# Patient Record
Sex: FEMALE | Race: BLACK OR AFRICAN AMERICAN | NOT HISPANIC OR LATINO | Employment: UNEMPLOYED | URBAN - METROPOLITAN AREA
[De-identification: names, ages, dates, MRNs, and addresses within clinical notes are randomized per-mention and may not be internally consistent; named-entity substitution may affect disease eponyms.]

---

## 2024-08-27 ENCOUNTER — OFFICE VISIT (OUTPATIENT)
Dept: URGENT CARE | Facility: URGENT CARE | Age: 49
End: 2024-08-27
Payer: COMMERCIAL

## 2024-08-27 VITALS
OXYGEN SATURATION: 98 % | WEIGHT: 265 LBS | HEART RATE: 91 BPM | BODY MASS INDEX: 42.77 KG/M2 | DIASTOLIC BLOOD PRESSURE: 127 MMHG | SYSTOLIC BLOOD PRESSURE: 194 MMHG | TEMPERATURE: 98.7 F

## 2024-08-27 DIAGNOSIS — R05.1 ACUTE COUGH: Primary | ICD-10-CM

## 2024-08-27 DIAGNOSIS — L29.9 PRURITIC DISORDER: ICD-10-CM

## 2024-08-27 DIAGNOSIS — I10 BENIGN ESSENTIAL HYPERTENSION: ICD-10-CM

## 2024-08-27 DIAGNOSIS — Z20.818 STREP THROAT EXPOSURE: ICD-10-CM

## 2024-08-27 LAB — SARS-COV-2 RNA RESP QL NAA+PROBE: NEGATIVE

## 2024-08-27 PROCEDURE — 87635 SARS-COV-2 COVID-19 AMP PRB: CPT | Performed by: FAMILY MEDICINE

## 2024-08-27 PROCEDURE — 99204 OFFICE O/P NEW MOD 45 MIN: CPT | Performed by: FAMILY MEDICINE

## 2024-08-27 RX ORDER — AMOXICILLIN 500 MG/1
500 CAPSULE ORAL 2 TIMES DAILY
Qty: 20 CAPSULE | Refills: 0 | Status: SHIPPED | OUTPATIENT
Start: 2024-08-27 | End: 2024-09-06

## 2024-08-27 RX ORDER — LISINOPRIL/HYDROCHLOROTHIAZIDE 10-12.5 MG
1 TABLET ORAL DAILY
Qty: 30 TABLET | Refills: 1 | Status: SHIPPED | OUTPATIENT
Start: 2024-08-27 | End: 2024-09-26

## 2024-08-27 RX ORDER — BENZONATATE 200 MG/1
200 CAPSULE ORAL 3 TIMES DAILY PRN
Qty: 21 CAPSULE | Refills: 0 | Status: SHIPPED | OUTPATIENT
Start: 2024-08-27 | End: 2024-09-03

## 2024-08-27 NOTE — LETTER
August 27, 2024      Edna Horton  [address] [facility] [address]        To Whom It May Concern:    Edna Horton was seen in our clinic 8/27/24. She may return to work 8/29/24  without restrictions.      Sincerely,        Dylan Verma, DO

## 2024-08-27 NOTE — PROGRESS NOTES
"SUBJECTIVE: Edna Horton is a 49 year old female presenting with a chief complaint of \"cold symptoms\" and cough .  Onset of symptoms was 4 day(s) ago.  Predisposing factors include tobacco.    No past medical history on file.  No Known Allergies  Social History     Tobacco Use    Smoking status: Every Day     Types: Cigarettes    Smokeless tobacco: Not on file   Substance Use Topics    Alcohol use: Not on file       ROS:  SKIN: no rash  GI: no vomiting    OBJECTIVE:  BP (!) 194/127   Pulse 91   Temp 98.7  F (37.1  C) (Tympanic)   Wt 120.2 kg (265 lb)   SpO2 98%   BMI 42.77 kg/m  GENERAL APPEARANCE: healthy, alert and no distress  EYES: EOMI,  PERRL, conjunctiva clear  HENT: ear canals and TM's normal.  Nose and mouth without ulcers, erythema or lesions  RESP: lungs clear to auscultation - no rales, rhonchi or wheezes  SKIN: no suspicious lesions or rashes      ICD-10-CM    1. Acute cough  R05.1 Symptomatic COVID-19 Virus (Coronavirus) by PCR Nose     benzonatate (TESSALON) 200 MG capsule      2. Pruritic disorder  L29.9 Adult Dermatology  Referral      3. Benign essential hypertension  I10 lisinopril-hydrochlorothiazide (ZESTORETIC) 10-12.5 MG tablet      4. Strep throat exposure  Z20.818 amoxicillin (AMOXIL) 500 MG capsule        Fluids/Rest, f/u if worse/not any better    "

## 2024-12-07 ENCOUNTER — APPOINTMENT (OUTPATIENT)
Dept: GENERAL RADIOLOGY | Facility: CLINIC | Age: 49
DRG: 287 | End: 2024-12-07
Attending: EMERGENCY MEDICINE
Payer: COMMERCIAL

## 2024-12-07 ENCOUNTER — HOSPITAL ENCOUNTER (EMERGENCY)
Facility: CLINIC | Age: 49
Discharge: LEFT BEFORE DISCHARGE | DRG: 287 | End: 2024-12-08
Attending: EMERGENCY MEDICINE | Admitting: EMERGENCY MEDICINE
Payer: COMMERCIAL

## 2024-12-07 VITALS
SYSTOLIC BLOOD PRESSURE: 191 MMHG | TEMPERATURE: 98.5 F | DIASTOLIC BLOOD PRESSURE: 106 MMHG | WEIGHT: 265 LBS | RESPIRATION RATE: 16 BRPM | HEART RATE: 92 BPM | OXYGEN SATURATION: 98 % | BODY MASS INDEX: 42.77 KG/M2

## 2024-12-07 DIAGNOSIS — I16.0 HYPERTENSIVE URGENCY: ICD-10-CM

## 2024-12-07 DIAGNOSIS — R07.9 CHEST PAIN, UNSPECIFIED TYPE: ICD-10-CM

## 2024-12-07 LAB
ALBUMIN SERPL BCG-MCNC: 3.7 G/DL (ref 3.5–5.2)
ALP SERPL-CCNC: 92 U/L (ref 40–150)
ALT SERPL W P-5'-P-CCNC: 11 U/L (ref 0–50)
ANION GAP SERPL CALCULATED.3IONS-SCNC: 11 MMOL/L (ref 7–15)
AST SERPL W P-5'-P-CCNC: 21 U/L (ref 0–45)
ATRIAL RATE - MUSE: 89 BPM
BASOPHILS # BLD AUTO: 0.1 10E3/UL (ref 0–0.2)
BASOPHILS NFR BLD AUTO: 1 %
BILIRUB SERPL-MCNC: 0.7 MG/DL
BUN SERPL-MCNC: 9.9 MG/DL (ref 6–20)
CALCIUM SERPL-MCNC: 9.1 MG/DL (ref 8.8–10.4)
CHLORIDE SERPL-SCNC: 104 MMOL/L (ref 98–107)
CREAT SERPL-MCNC: 0.89 MG/DL (ref 0.51–0.95)
D DIMER PPP FEU-MCNC: 8.7 UG/ML FEU (ref 0–0.5)
DIASTOLIC BLOOD PRESSURE - MUSE: NORMAL MMHG
EGFRCR SERPLBLD CKD-EPI 2021: 80 ML/MIN/1.73M2
EOSINOPHIL # BLD AUTO: 0.2 10E3/UL (ref 0–0.7)
EOSINOPHIL NFR BLD AUTO: 1 %
ERYTHROCYTE [DISTWIDTH] IN BLOOD BY AUTOMATED COUNT: 13.5 % (ref 10–15)
GLUCOSE SERPL-MCNC: 143 MG/DL (ref 70–99)
HCO3 SERPL-SCNC: 25 MMOL/L (ref 22–29)
HCT VFR BLD AUTO: 34.9 % (ref 35–47)
HGB BLD-MCNC: 11.1 G/DL (ref 11.7–15.7)
HOLD SPECIMEN: NORMAL
IMM GRANULOCYTES # BLD: 0.1 10E3/UL
IMM GRANULOCYTES NFR BLD: 1 %
INTERPRETATION ECG - MUSE: NORMAL
LYMPHOCYTES # BLD AUTO: 2.5 10E3/UL (ref 0.8–5.3)
LYMPHOCYTES NFR BLD AUTO: 23 %
MCH RBC QN AUTO: 28.2 PG (ref 26.5–33)
MCHC RBC AUTO-ENTMCNC: 31.8 G/DL (ref 31.5–36.5)
MCV RBC AUTO: 89 FL (ref 78–100)
MONOCYTES # BLD AUTO: 0.7 10E3/UL (ref 0–1.3)
MONOCYTES NFR BLD AUTO: 6 %
NEUTROPHILS # BLD AUTO: 7.4 10E3/UL (ref 1.6–8.3)
NEUTROPHILS NFR BLD AUTO: 68 %
NRBC # BLD AUTO: 0 10E3/UL
NRBC BLD AUTO-RTO: 0 /100
P AXIS - MUSE: 65 DEGREES
PLATELET # BLD AUTO: 248 10E3/UL (ref 150–450)
POTASSIUM SERPL-SCNC: 3.8 MMOL/L (ref 3.4–5.3)
PR INTERVAL - MUSE: 198 MS
PROT SERPL-MCNC: 7.5 G/DL (ref 6.4–8.3)
QRS DURATION - MUSE: 76 MS
QT - MUSE: 398 MS
QTC - MUSE: 484 MS
R AXIS - MUSE: 14 DEGREES
RBC # BLD AUTO: 3.94 10E6/UL (ref 3.8–5.2)
SODIUM SERPL-SCNC: 140 MMOL/L (ref 135–145)
SYSTOLIC BLOOD PRESSURE - MUSE: NORMAL MMHG
T AXIS - MUSE: 99 DEGREES
TROPONIN T SERPL HS-MCNC: 26 NG/L
TROPONIN T SERPL HS-MCNC: 28 NG/L
VENTRICULAR RATE- MUSE: 89 BPM
WBC # BLD AUTO: 10.9 10E3/UL (ref 4–11)

## 2024-12-07 PROCEDURE — 80053 COMPREHEN METABOLIC PANEL: CPT | Performed by: EMERGENCY MEDICINE

## 2024-12-07 PROCEDURE — 85379 FIBRIN DEGRADATION QUANT: CPT | Performed by: STUDENT IN AN ORGANIZED HEALTH CARE EDUCATION/TRAINING PROGRAM

## 2024-12-07 PROCEDURE — 84484 ASSAY OF TROPONIN QUANT: CPT | Mod: 91 | Performed by: EMERGENCY MEDICINE

## 2024-12-07 PROCEDURE — 85025 COMPLETE CBC W/AUTO DIFF WBC: CPT | Performed by: EMERGENCY MEDICINE

## 2024-12-07 PROCEDURE — 93005 ELECTROCARDIOGRAM TRACING: CPT

## 2024-12-07 PROCEDURE — 36415 COLL VENOUS BLD VENIPUNCTURE: CPT | Performed by: EMERGENCY MEDICINE

## 2024-12-07 PROCEDURE — 250N000011 HC RX IP 250 OP 636: Performed by: EMERGENCY MEDICINE

## 2024-12-07 PROCEDURE — 71046 X-RAY EXAM CHEST 2 VIEWS: CPT

## 2024-12-07 PROCEDURE — 99291 CRITICAL CARE FIRST HOUR: CPT | Mod: 25

## 2024-12-07 PROCEDURE — 96374 THER/PROPH/DIAG INJ IV PUSH: CPT

## 2024-12-07 PROCEDURE — 250N000013 HC RX MED GY IP 250 OP 250 PS 637: Performed by: EMERGENCY MEDICINE

## 2024-12-07 RX ORDER — LISINOPRIL 10 MG/1
10 TABLET ORAL DAILY
COMMUNITY
End: 2024-12-07

## 2024-12-07 RX ORDER — NITROGLYCERIN 0.4 MG/1
0.4 TABLET SUBLINGUAL ONCE
Status: COMPLETED | OUTPATIENT
Start: 2024-12-07 | End: 2024-12-07

## 2024-12-07 RX ORDER — LISINOPRIL AND HYDROCHLOROTHIAZIDE 10; 12.5 MG/1; MG/1
1 TABLET ORAL DAILY
Status: ON HOLD | COMMUNITY
End: 2024-12-09

## 2024-12-07 RX ORDER — LABETALOL HYDROCHLORIDE 5 MG/ML
10 INJECTION, SOLUTION INTRAVENOUS ONCE
Status: COMPLETED | OUTPATIENT
Start: 2024-12-07 | End: 2024-12-07

## 2024-12-07 RX ADMIN — NITROGLYCERIN 0.4 MG: 0.4 TABLET SUBLINGUAL at 19:55

## 2024-12-07 RX ADMIN — LABETALOL HYDROCHLORIDE 10 MG: 5 INJECTION INTRAVENOUS at 20:16

## 2024-12-07 ASSESSMENT — ACTIVITIES OF DAILY LIVING (ADL)
ADLS_ACUITY_SCORE: 41

## 2024-12-07 NOTE — ED TRIAGE NOTES
"  Chest pain started this morning, never had pain like this, it just hurts, I dont know if its gas or I slept wrong,  2021 was seen for CP and had \"mild\" heart attack , coughed up what appeared to be a blood clot on Wednesday       "

## 2024-12-07 NOTE — ED TRIAGE NOTES
Pt arrives via ambulance from home for HTN and chest pain. Pt began having chest pain at 0730 this morning and has been intermittent all day. Hypertensive for EMS with . 324 ASA given by EMS. .

## 2024-12-07 NOTE — ED TRIAGE NOTES
Triage Assessment (Adult)       Row Name 12/07/24 6549          Triage Assessment    Airway WDL WDL        Respiratory WDL    Respiratory WDL WDL        Skin Circulation/Temperature WDL    Skin Circulation/Temperature WDL WDL        Cardiac WDL    Cardiac WDL X  right chest pain, comes and goes, moving makes it worse        Cognitive/Neuro/Behavioral WDL    Cognitive/Neuro/Behavioral WDL WDL

## 2024-12-08 ENCOUNTER — APPOINTMENT (OUTPATIENT)
Dept: CT IMAGING | Facility: CLINIC | Age: 49
DRG: 287 | End: 2024-12-08
Attending: EMERGENCY MEDICINE

## 2024-12-08 ENCOUNTER — HOSPITAL ENCOUNTER (INPATIENT)
Facility: CLINIC | Age: 49
LOS: 1 days | Discharge: HOME | DRG: 287 | End: 2024-12-11
Attending: EMERGENCY MEDICINE | Admitting: STUDENT IN AN ORGANIZED HEALTH CARE EDUCATION/TRAINING PROGRAM

## 2024-12-08 DIAGNOSIS — R94.39 ABNORMAL CARDIOVASCULAR STRESS TEST: ICD-10-CM

## 2024-12-08 DIAGNOSIS — R07.9 CHEST PAIN, UNSPECIFIED TYPE: ICD-10-CM

## 2024-12-08 DIAGNOSIS — I16.0 HYPERTENSIVE URGENCY: ICD-10-CM

## 2024-12-08 DIAGNOSIS — I21.4 NSTEMI (NON-ST ELEVATED MYOCARDIAL INFARCTION) (H): ICD-10-CM

## 2024-12-08 DIAGNOSIS — I1A.0 RESISTANT HYPERTENSION: ICD-10-CM

## 2024-12-08 DIAGNOSIS — E78.00 HYPERCHOLESTEREMIA: Primary | ICD-10-CM

## 2024-12-08 DIAGNOSIS — N92.4 EXCESSIVE BLEEDING IN PREMENOPAUSAL PERIOD: ICD-10-CM

## 2024-12-08 LAB
ANION GAP SERPL CALCULATED.3IONS-SCNC: 12 MMOL/L (ref 7–15)
ATRIAL RATE - MUSE: 81 BPM
BASOPHILS # BLD AUTO: 0 10E3/UL (ref 0–0.2)
BASOPHILS NFR BLD AUTO: 0 %
BUN SERPL-MCNC: 12.1 MG/DL (ref 6–20)
CALCIUM SERPL-MCNC: 9.2 MG/DL (ref 8.8–10.4)
CHLORIDE SERPL-SCNC: 102 MMOL/L (ref 98–107)
CHOLEST SERPL-MCNC: 178 MG/DL
CREAT SERPL-MCNC: 1.12 MG/DL (ref 0.51–0.95)
DIASTOLIC BLOOD PRESSURE - MUSE: NORMAL MMHG
EGFRCR SERPLBLD CKD-EPI 2021: 60 ML/MIN/1.73M2
EOSINOPHIL # BLD AUTO: 0.2 10E3/UL (ref 0–0.7)
EOSINOPHIL NFR BLD AUTO: 2 %
ERYTHROCYTE [DISTWIDTH] IN BLOOD BY AUTOMATED COUNT: 13.6 % (ref 10–15)
EST. AVERAGE GLUCOSE BLD GHB EST-MCNC: 148 MG/DL
FERRITIN SERPL-MCNC: 53 NG/ML (ref 6–175)
GLUCOSE SERPL-MCNC: 230 MG/DL (ref 70–99)
HBA1C MFR BLD: 6.8 %
HCO3 SERPL-SCNC: 26 MMOL/L (ref 22–29)
HCT VFR BLD AUTO: 32.6 % (ref 35–47)
HDLC SERPL-MCNC: 42 MG/DL
HGB BLD-MCNC: 10.6 G/DL (ref 11.7–15.7)
HOLD SPECIMEN: NORMAL
IMM GRANULOCYTES # BLD: 0 10E3/UL
IMM GRANULOCYTES NFR BLD: 0 %
INTERPRETATION ECG - MUSE: NORMAL
IRON BINDING CAPACITY (ROCHE): 331 UG/DL (ref 240–430)
IRON SATN MFR SERPL: 19 % (ref 15–46)
IRON SERPL-MCNC: 64 UG/DL (ref 37–145)
LDLC SERPL CALC-MCNC: 115 MG/DL
LYMPHOCYTES # BLD AUTO: 2.6 10E3/UL (ref 0.8–5.3)
LYMPHOCYTES NFR BLD AUTO: 26 %
MCH RBC QN AUTO: 28.5 PG (ref 26.5–33)
MCHC RBC AUTO-ENTMCNC: 32.5 G/DL (ref 31.5–36.5)
MCV RBC AUTO: 88 FL (ref 78–100)
MONOCYTES # BLD AUTO: 0.7 10E3/UL (ref 0–1.3)
MONOCYTES NFR BLD AUTO: 7 %
NEUTROPHILS # BLD AUTO: 6.3 10E3/UL (ref 1.6–8.3)
NEUTROPHILS NFR BLD AUTO: 64 %
NONHDLC SERPL-MCNC: 136 MG/DL
NRBC # BLD AUTO: 0 10E3/UL
NRBC BLD AUTO-RTO: 0 /100
P AXIS - MUSE: 64 DEGREES
PLATELET # BLD AUTO: 236 10E3/UL (ref 150–450)
POTASSIUM SERPL-SCNC: 3.6 MMOL/L (ref 3.4–5.3)
PR INTERVAL - MUSE: 206 MS
QRS DURATION - MUSE: 76 MS
QT - MUSE: 404 MS
QTC - MUSE: 469 MS
R AXIS - MUSE: -1 DEGREES
RBC # BLD AUTO: 3.72 10E6/UL (ref 3.8–5.2)
SODIUM SERPL-SCNC: 140 MMOL/L (ref 135–145)
SYSTOLIC BLOOD PRESSURE - MUSE: NORMAL MMHG
T AXIS - MUSE: 111 DEGREES
TRIGL SERPL-MCNC: 106 MG/DL
TROPONIN T SERPL HS-MCNC: 25 NG/L
TROPONIN T SERPL HS-MCNC: 27 NG/L
TSH SERPL DL<=0.005 MIU/L-ACNC: 1.75 UIU/ML (ref 0.3–4.2)
VENTRICULAR RATE- MUSE: 81 BPM
WBC # BLD AUTO: 9.8 10E3/UL (ref 4–11)

## 2024-12-08 PROCEDURE — 85004 AUTOMATED DIFF WBC COUNT: CPT | Performed by: EMERGENCY MEDICINE

## 2024-12-08 PROCEDURE — 36415 COLL VENOUS BLD VENIPUNCTURE: CPT | Performed by: EMERGENCY MEDICINE

## 2024-12-08 PROCEDURE — G0378 HOSPITAL OBSERVATION PER HR: HCPCS

## 2024-12-08 PROCEDURE — 99285 EMERGENCY DEPT VISIT HI MDM: CPT | Mod: 25

## 2024-12-08 PROCEDURE — 71275 CT ANGIOGRAPHY CHEST: CPT

## 2024-12-08 PROCEDURE — 93005 ELECTROCARDIOGRAM TRACING: CPT

## 2024-12-08 PROCEDURE — 250N000011 HC RX IP 250 OP 636: Performed by: EMERGENCY MEDICINE

## 2024-12-08 PROCEDURE — 250N000009 HC RX 250: Performed by: EMERGENCY MEDICINE

## 2024-12-08 PROCEDURE — 82465 ASSAY BLD/SERUM CHOLESTEROL: CPT

## 2024-12-08 PROCEDURE — 250N000013 HC RX MED GY IP 250 OP 250 PS 637

## 2024-12-08 PROCEDURE — 82728 ASSAY OF FERRITIN: CPT

## 2024-12-08 PROCEDURE — 80061 LIPID PANEL: CPT

## 2024-12-08 PROCEDURE — 84484 ASSAY OF TROPONIN QUANT: CPT | Mod: 91 | Performed by: EMERGENCY MEDICINE

## 2024-12-08 PROCEDURE — 84443 ASSAY THYROID STIM HORMONE: CPT

## 2024-12-08 PROCEDURE — 80048 BASIC METABOLIC PNL TOTAL CA: CPT | Performed by: EMERGENCY MEDICINE

## 2024-12-08 PROCEDURE — 83550 IRON BINDING TEST: CPT

## 2024-12-08 PROCEDURE — 96374 THER/PROPH/DIAG INJ IV PUSH: CPT | Mod: 59

## 2024-12-08 PROCEDURE — 99223 1ST HOSP IP/OBS HIGH 75: CPT

## 2024-12-08 PROCEDURE — 83036 HEMOGLOBIN GLYCOSYLATED A1C: CPT

## 2024-12-08 RX ORDER — LISINOPRIL AND HYDROCHLOROTHIAZIDE 10; 12.5 MG/1; MG/1
1 TABLET ORAL DAILY
Status: DISCONTINUED | OUTPATIENT
Start: 2024-12-09 | End: 2024-12-11

## 2024-12-08 RX ORDER — AMOXICILLIN 250 MG
1 CAPSULE ORAL 2 TIMES DAILY PRN
Status: DISCONTINUED | OUTPATIENT
Start: 2024-12-08 | End: 2024-12-11 | Stop reason: HOSPADM

## 2024-12-08 RX ORDER — AMLODIPINE BESYLATE 10 MG/1
10 TABLET ORAL DAILY
Status: DISCONTINUED | OUTPATIENT
Start: 2024-12-08 | End: 2024-12-11 | Stop reason: HOSPADM

## 2024-12-08 RX ORDER — ONDANSETRON 2 MG/ML
4 INJECTION INTRAMUSCULAR; INTRAVENOUS EVERY 6 HOURS PRN
Status: DISCONTINUED | OUTPATIENT
Start: 2024-12-08 | End: 2024-12-11 | Stop reason: HOSPADM

## 2024-12-08 RX ORDER — HYDRALAZINE HYDROCHLORIDE 25 MG/1
25 TABLET, FILM COATED ORAL EVERY 8 HOURS PRN
Status: DISCONTINUED | OUTPATIENT
Start: 2024-12-08 | End: 2024-12-11 | Stop reason: HOSPADM

## 2024-12-08 RX ORDER — ACETAMINOPHEN 325 MG/1
975 TABLET ORAL 3 TIMES DAILY
Status: DISCONTINUED | OUTPATIENT
Start: 2024-12-08 | End: 2024-12-11 | Stop reason: HOSPADM

## 2024-12-08 RX ORDER — PROCHLORPERAZINE MALEATE 10 MG
10 TABLET ORAL EVERY 6 HOURS PRN
Status: DISCONTINUED | OUTPATIENT
Start: 2024-12-08 | End: 2024-12-11 | Stop reason: HOSPADM

## 2024-12-08 RX ORDER — LABETALOL HYDROCHLORIDE 5 MG/ML
10 INJECTION, SOLUTION INTRAVENOUS ONCE
Status: COMPLETED | OUTPATIENT
Start: 2024-12-08 | End: 2024-12-08

## 2024-12-08 RX ORDER — AMOXICILLIN 250 MG
2 CAPSULE ORAL 2 TIMES DAILY PRN
Status: DISCONTINUED | OUTPATIENT
Start: 2024-12-08 | End: 2024-12-11 | Stop reason: HOSPADM

## 2024-12-08 RX ORDER — ONDANSETRON 4 MG/1
4 TABLET, ORALLY DISINTEGRATING ORAL EVERY 6 HOURS PRN
Status: DISCONTINUED | OUTPATIENT
Start: 2024-12-08 | End: 2024-12-11 | Stop reason: HOSPADM

## 2024-12-08 RX ORDER — IOPAMIDOL 755 MG/ML
80 INJECTION, SOLUTION INTRAVASCULAR ONCE
Status: COMPLETED | OUTPATIENT
Start: 2024-12-08 | End: 2024-12-08

## 2024-12-08 RX ADMIN — LABETALOL HYDROCHLORIDE 10 MG: 5 INJECTION INTRAVENOUS at 16:42

## 2024-12-08 RX ADMIN — HYDRALAZINE HYDROCHLORIDE 25 MG: 25 TABLET ORAL at 19:57

## 2024-12-08 RX ADMIN — SODIUM CHLORIDE 100 ML: 9 INJECTION, SOLUTION INTRAVENOUS at 14:39

## 2024-12-08 RX ADMIN — ACETAMINOPHEN 975 MG: 325 TABLET, FILM COATED ORAL at 19:57

## 2024-12-08 RX ADMIN — AMLODIPINE BESYLATE 10 MG: 10 TABLET ORAL at 19:57

## 2024-12-08 RX ADMIN — IOPAMIDOL 80 ML: 755 INJECTION, SOLUTION INTRAVENOUS at 14:39

## 2024-12-08 ASSESSMENT — COLUMBIA-SUICIDE SEVERITY RATING SCALE - C-SSRS
6. HAVE YOU EVER DONE ANYTHING, STARTED TO DO ANYTHING, OR PREPARED TO DO ANYTHING TO END YOUR LIFE?: NO
2. HAVE YOU ACTUALLY HAD ANY THOUGHTS OF KILLING YOURSELF IN THE PAST MONTH?: NO
1. IN THE PAST MONTH, HAVE YOU WISHED YOU WERE DEAD OR WISHED YOU COULD GO TO SLEEP AND NOT WAKE UP?: NO

## 2024-12-08 ASSESSMENT — ACTIVITIES OF DAILY LIVING (ADL)
ADLS_ACUITY_SCORE: 41

## 2024-12-08 NOTE — ED NOTES
Pt educated on the risks of leaving as pt's blood pressure remains high despite various interventions in ED. Pt states she does not want to stay overnight in the hospital and that her right sided chest pain feels a lot better. Discharge instructions reviewed - IV access discontinued. Pt ambulatory out of ED with steady gait - waiting for ride in lobby.

## 2024-12-08 NOTE — PROGRESS NOTES
RECEIVING UNIT ED HANDOFF REVIEW    ED Nurse Handoff Report was reviewed by: Prince Hira RN on December 8, 2024 at 6:00 PM

## 2024-12-08 NOTE — ED PROVIDER NOTES
Emergency Department Note      History of Present Illness     Chief Complaint   Chest Pain and Abnormal Labs      HPI   Edna Horton is a 49 year old female with a history of diabetes and hypertension who presents with ongoing right-sided chest pain.  Of note the patient was seen in the emergency department yesterday and had a broad workup performed including EKG, laboratory work and D-dimer.  Patient was encouraged to stay in the hospital yesterday but opted to leave AGAINST MEDICAL ADVICE.  Patient continues to have right-sided chest pain but denies any active shortness of breath, cough, fever or constitutional symptoms.  She denies any abdominal pain, vomiting or diarrhea.  She denies any lower extremity swelling.  She does note a history of diabetes and elevated blood pressure for which she takes lisinopril hydrochlorothiazide.  She does not currently have a primary care doctor and states that she goes to urgent care when she needs refills of her blood pressure medications.  She notes no personal history of cardiac disease.    Independent Historian   None    Review of External Notes   Reviewed patient's prior emergency department visit yesterday, 12/7/2024.  Patient was seen for chest pain on the right side with no radiation to the jaw arm or back.  She also had significantly elevated blood pressure and has underlying history of diabetes.  Patient had a significantly elevated D-dimer and ultimately left AGAINST MEDICAL ADVICE prior to CT imaging being performed.  At the time yesterday she did not feel that her symptoms were related to her heart and she felt it was more musculoskeletal in nature.  Given the chest pain and elevated heart score she was recommended to admission to the hospital.  She ultimately opted to discharge home AGAINST MEDICAL ADVICE last night.    Past Medical History     Medical History and Problem List   No past medical history on file.    Medications    lisinopril-hydrochlorothiazide (ZESTORETIC) 10-12.5 MG tablet        Surgical History   No past surgical history on file.    Physical Exam     Patient Vitals for the past 24 hrs:   BP Temp Temp src Pulse Resp SpO2   12/08/24 1730 (!) 175/107 -- -- 86 27 95 %   12/08/24 1700 (!) 181/98 -- -- 83 29 95 %   12/08/24 1645 (!) 190/114 -- -- 85 27 98 %   12/08/24 1641 (!) 190/114 -- -- 88 20 100 %   12/08/24 1622 -- 98  F (36.7  C) Temporal -- -- --   12/08/24 1614 (!) 221/132 -- -- -- -- --   12/08/24 1406 (!) 200/121 -- -- 97 20 95 %     Physical Exam  General: Alert, appears well-developed and well-nourished. Cooperative.     In mild distress  HEENT:  Head:  Atraumatic  Ears:  External ears are normal  Mouth/Throat:  Oropharynx is without erythema or exudate and mucous membranes are moist.   Eyes:   Conjunctivae normal and EOM are normal. No scleral icterus.  CV:  Normal rate, regular rhythm, normal heart sounds and radial pulses are 2+ and symmetric.  No murmur.  Resp:  Breath sounds are clear bilaterally    Non-labored, no retractions or accessory muscle use  GI:  Obese.  Abdomen is soft, no distension, no tenderness. No rebound or guarding.  No CVA tenderness bilaterally  MS:  Normal range of motion. No edema.    No reproducible chest pain to palpation of left anterior chest wall    Normal strength in all 4 extremities.     Back atraumatic.    No midline cervical, thoracic, or lumbar tenderness  Skin:  Warm and dry.  No rash or lesions noted.  Neuro:   Alert. Normal strength.  GCS: 15  Psych: Normal mood and affect.    Diagnostics     Lab Results   Labs Ordered and Resulted from Time of ED Arrival to Time of ED Departure   BASIC METABOLIC PANEL - Abnormal       Result Value    Sodium 140      Potassium 3.6      Chloride 102      Carbon Dioxide (CO2) 26      Anion Gap 12      Urea Nitrogen 12.1      Creatinine 1.12 (*)     GFR Estimate 60 (*)     Calcium 9.2      Glucose 230 (*)    TROPONIN T, HIGH SENSITIVITY -  Abnormal    Troponin T, High Sensitivity 25 (*)    CBC WITH PLATELETS AND DIFFERENTIAL - Abnormal    WBC Count 9.8      RBC Count 3.72 (*)     Hemoglobin 10.6 (*)     Hematocrit 32.6 (*)     MCV 88      MCH 28.5      MCHC 32.5      RDW 13.6      Platelet Count 236      % Neutrophils 64      % Lymphocytes 26      % Monocytes 7      % Eosinophils 2      % Basophils 0      % Immature Granulocytes 0      NRBCs per 100 WBC 0      Absolute Neutrophils 6.3      Absolute Lymphocytes 2.6      Absolute Monocytes 0.7      Absolute Eosinophils 0.2      Absolute Basophils 0.0      Absolute Immature Granulocytes 0.0      Absolute NRBCs 0.0     TROPONIN T, HIGH SENSITIVITY       Imaging   CT Chest Pulmonary Embolism w Contrast   Final Result   IMPRESSION:   1.  No pulmonary embolism demonstrated.   2.  Mildly enlarged pulmonary artery, this can be associated with pulmonary hypertension.   3.  Mosaic attenuation in the lungs, question areas of air trapping.           ECG results from 12/08/24   EKG 12-lead, tracing only     Value    Systolic Blood Pressure     Diastolic Blood Pressure     Ventricular Rate 81    Atrial Rate 81    MO Interval 206    QRS Duration 76        QTc 469    P Axis 64    R AXIS -1    T Axis 111    Interpretation ECG      Sinus rhythm  Biatrial enlargement  Minimal voltage criteria for LVH, may be normal variant ( [place] product )  Anterior infarct , age undetermined  ST & T wave abnormality, consider lateral ischemia  Abnormal ECG  When compared with ECG of 07-Dec-2024 17:04,  No significant change was found       Independent Interpretation   None    ED Course      Medications Administered   Medications   iopamidol (ISOVUE-370) solution 80 mL (80 mLs Intravenous $Given 12/8/24 2118)   sodium chloride 0.9 % bag 500mL for CT scan flush use (100 mLs Intravenous $Given 12/8/24 1430)   labetalol (NORMODYNE/TRANDATE) injection 10 mg (10 mg Intravenous $Given 12/8/24 3786)       Procedures   Procedures      Discussion of Management   Admitting HospitalistBronwyn    ED Course   ED Course as of 12/08/24 1748   Sun Dec 08, 2024   7022 I spoke with Dr. Barnhart who agreed to observation admission for hypertensive urgency and chest pain evaluation.        Additional Documentation  None    Medical Decision Making / Diagnosis     CMS Diagnoses: None    MIPS   CT for PE was ordered because the patient had an abnormal d-dimer.  Abnormal D dimer was from 12/7/2024.    JOSE DANIEL Horton is a 49 year old female who presents for evaluation of chest pain and elevated blood pressure.    A broad differential was of course considered.  Certainly ischemia is in differential. I doubt pulmonary embolism, aortic dissection, pneumonia or pneumothorax.  Other etiologies of chest pain considered in this patient included chest wall source, esophageal spasm or GI source, pleuritis, referred pain, etc. she had a workup performed yesterday that included an elevated D-dimer but she had left AGAINST MEDICAL ADVICE prior to CT imaging of the chest.  CT imaging of the chest was obtained today and thankfully no evidence of pulmonary embolism.  There was evidence of pulmonary artery enlargement that could be suggestive of potential pulmonary hypertension.  I noted that the patient had persistently elevated blood pressures today as well concerning for accelerated hypertension versus hypertensive emergency.  She did receive a dose of IV labetalol while here in the emergency department.  This is starting to significantly improve her blood pressure control.  She has been taking lisinopril/hydrochlorothiazide in the outpatient setting but does not have a primary care doctor nor has she had any adjustments to her dosing in recent months or years.  Given the persistence of the chest pain symptoms even though her troponins continue to remain flat in comparison with yesterday's values I would recommend observation for continued cardiac  monitoring and consideration of stress testing.    My suspicion of unstable angina at this point is very low and given risk/benefit ratio would not start lovenox or heparin. Given the nature and timing of the patient's symptoms, I will admit the patient  to the hospital for further workup and treatment  The patient agrees to be admitted and all questions were answered.      Disposition   The patient was admitted to the hospital.     Diagnosis     ICD-10-CM    1. Chest pain, unspecified type  R07.9       2. Hypertensive urgency  I16.0            Discharge Medications   New Prescriptions    No medications on file         MD Esha Moody Scott, MD  12/08/24 1740

## 2024-12-08 NOTE — ED NOTES
[facility]  ED Nurse Handoff Report    ED Chief complaint: Chest Pain and Abnormal Labs      ED Diagnosis:   Final diagnoses:   Chest pain, unspecified type   Hypertensive urgency       Code Status: Per admitting    Allergies: No Known Allergies    Patient Story:   Pt presents to ED after leaving AMA yesterday for chest pain and hypertension. Pt blood results came back to have a positive d-dimer at 8.7. Pt did her CT scan today which was negative for PE, but showed some possible portal hypertension. Given labetalol in the ED. Hospitalist plan for echocadiogram and stress test for pt hospital stay.    Focused Assessment:    Neuro: Alert, oriented x 4  Respiratory: Room air, dyspnea on exertion  Cardiology:  chest pain that has improved since yesterday   Gastrointestinal: soft, non tender, non distended   Genitourinary/Renal:    Musculoskeletal: moves all extremities   Skin: Intact skin   Lines: 18G RAC    Labs Ordered and Resulted from Time of ED Arrival to Time of ED Departure   BASIC METABOLIC PANEL - Abnormal       Result Value    Sodium 140      Potassium 3.6      Chloride 102      Carbon Dioxide (CO2) 26      Anion Gap 12      Urea Nitrogen 12.1      Creatinine 1.12 (*)     GFR Estimate 60 (*)     Calcium 9.2      Glucose 230 (*)    TROPONIN T, HIGH SENSITIVITY - Abnormal    Troponin T, High Sensitivity 25 (*)    CBC WITH PLATELETS AND DIFFERENTIAL - Abnormal    WBC Count 9.8      RBC Count 3.72 (*)     Hemoglobin 10.6 (*)     Hematocrit 32.6 (*)     MCV 88      MCH 28.5      MCHC 32.5      RDW 13.6      Platelet Count 236      % Neutrophils 64      % Lymphocytes 26      % Monocytes 7      % Eosinophils 2      % Basophils 0      % Immature Granulocytes 0      NRBCs per 100 WBC 0      Absolute Neutrophils 6.3      Absolute Lymphocytes 2.6      Absolute Monocytes 0.7      Absolute Eosinophils 0.2      Absolute Basophils 0.0      Absolute Immature Granulocytes 0.0      Absolute NRBCs 0.0      TROPONIN T, HIGH SENSITIVITY        CT Chest Pulmonary Embolism w Contrast   Final Result   IMPRESSION:   1.  No pulmonary embolism demonstrated.   2.  Mildly enlarged pulmonary artery, this can be associated with pulmonary hypertension.   3.  Mosaic attenuation in the lungs, question areas of air trapping.               Treatments and/or interventions provided:      Medications   iopamidol (ISOVUE-370) solution 80 mL (80 mLs Intravenous $Given 12/8/24 1439)   sodium chloride 0.9 % bag 500mL for CT scan flush use (100 mLs Intravenous $Given 12/8/24 1439)   labetalol (NORMODYNE/TRANDATE) injection 10 mg (10 mg Intravenous $Given 12/8/24 1642)        Patient's response to treatments and/or interventions:   Resting comfortably    To be done/followed up on inpatient unit:    See any in-patient orders    Does this patient have any cognitive concerns?:  none    Activity level - Baseline/Home:    Independent    Activity Level - Current:     Independent    Patient's Preferred language: English     Needed?: No    Isolation: None  Infection: Not Applicable  Patient tested for COVID 19 prior to admission: NO    Bariatric?: No    Vital Signs:   Vitals:    12/08/24 1641 12/08/24 1645 12/08/24 1700 12/08/24 1730   BP: (!) 190/114 (!) 190/114 (!) 181/98 (!) 175/107   Pulse: 88 85 83 86   Resp: 20 27 29 27   Temp:       TempSrc:       SpO2: 100% 98% 95% 95%       Cardiac Rhythm:Cardiac Rhythm: Normal sinus rhythm    Was the PSS-3 completed:   Yes    Family Comments: partner at bedside    For the majority of the shift this patient's behavior was Green.   Behavioral interventions performed were none    ED NURSE PHONE NUMBER: [phone]

## 2024-12-08 NOTE — ED NOTES
Pt notified of elevated dimer by MD Dover via phone. Pt states she will return to ED for a CT scan. Pt has not arrived to ED yet, but undid discharge for CT order. Pt will be placed in ED room upon arrival.

## 2024-12-08 NOTE — PHARMACY-ADMISSION MEDICATION HISTORY
Pharmacist Admission Medication History    Admission medication history is complete. The information provided in this note is only as accurate as the sources available at the time of the update.    Information Source(s): Patient and CareEverywhere/SureScripts via in-person    Pertinent Information: Patient endorses only taking lisinopril/hydrochlorothiazide; denies atenolol at this time.  Patient states she takes her medications inconsistently.    Changes made to PTA medication list:  Added: None  Deleted: Atenolol  Changed: None    Allergies reviewed with patient and updates made in EHR: yes    Medication History Completed By: Jonah Farnsworth [facility] 12/7/2024 8:42 PM    PTA Med List   Medication Sig Last Dose/Taking    lisinopril-hydrochlorothiazide (ZESTORETIC) 10-12.5 MG tablet Take 1 tablet by mouth daily. Past Month

## 2024-12-08 NOTE — ED TRIAGE NOTES
Patient returned to ED after receiving a phone call regarding a positive D dimer test. Patient left AMA yesterday. Patient reports right sided chest pain that is worse with movement. Patient remains hypertensive: 200/121, states she took her BP medication today. Denies recent travel. no hx of blood clots and has not traveled. Patient currently smokes.      Triage Assessment (Adult)       Row Name 12/08/24 1407          Triage Assessment    Airway WDL WDL        Respiratory WDL    Respiratory WDL WDL        Skin Circulation/Temperature WDL    Skin Circulation/Temperature WDL WDL        Cardiac WDL    Cardiac WDL X  chest pain, HTN        Peripheral/Neurovascular WDL    Peripheral Neurovascular WDL WDL        Cognitive/Neuro/Behavioral WDL    Cognitive/Neuro/Behavioral WDL WDL

## 2024-12-08 NOTE — PHARMACY-ADMISSION MEDICATION HISTORY
Pharmacist Admission Medication History    Admission medication history is complete. The information provided in this note is only as accurate as the sources available at the time of the update.    Information Source(s): Patient and CareEverywhere/SureScripts via N/A    Pertinent Information: Interviewed pt yesterday and completed med hx.  Per ED note today, patient reports taking her only medication today, but yesterday she reported that she has been very inconsistent with taking it otherwise.    Changes made to PTA medication list:  Added: None  Deleted: None  Changed: None    Allergies reviewed with patient and updates made in EHR: no    Medication History Completed By: Jonah Farnsworth RPH 12/8/2024 4:36 PM    PTA Med List   Medication Sig Last Dose/Taking    lisinopril-hydrochlorothiazide (ZESTORETIC) 10-12.5 MG tablet Take 1 tablet by mouth daily. 12/8/2024 Morning

## 2024-12-08 NOTE — ED PROVIDER NOTES
"  Emergency Department Note      History of Present Illness     Chief Complaint   Chest Pain and Hypertension    HPI  Edna Horton is a 49 year old female who presents with concern of chest pain.  Its on the right side.  It does not radiate to the jaw, arm or back.  The patient reports that yesterday she had a large breakfast followed by a Coke followed by a strawberry beverage.  She reports not taking her blood pressure medicine yesterday morning or evening because she plan to drink a wine cooler last night.  The patient woke up at 5 AM this morning and noted that she is urinating more than normal.  She then reports that she had some right-sided chest pain and small cough.  She called paramedics.  They provided her an aspirin.  They brought her to the ED for further evaluation.  The patient reports a history of diabetes, high blood pressure and she is unsure about her cholesterol.  She reports having a \"small heart attack\" when she was in [place] once 2 years ago.  No stent placement.  She does smoke cigarettes.  She denies any history of DVT or pulmonary embolism.  No recent travel or surgery.  Not on hormones.    Independent Historian   None    Review of External Notes   ED note from Dr. Louis from 2/11/2015 for visit of hypertension.  Blood pressure in the ED at that time was 174/118.  She had been hypertensive at that has prior to that.    Past Medical History     Medical History and Problem List   No past medical history on file.    Medications   lisinopril-hydrochlorothiazide (ZESTORETIC) 10-12.5 MG tablet        Surgical History   No past surgical history on file.    Physical Exam     Patient Vitals for the past 24 hrs:   BP Temp Pulse Resp SpO2 Weight   12/07/24 2036 (!) 195/127 -- 83 16 98 % --   12/07/24 2016 (!) 208/134 -- -- -- -- --   12/07/24 1935 (!) 208/142 -- 88 -- -- --   12/07/24 1708 -- 98.5  F (36.9  C) -- -- -- --   12/07/24 1707 (!) 230/141 -- 88 16 98 % 120.2 kg (265 lb) "     Physical Exam  General:  Sitting on bed.  HENT:  No obvious trauma to head  Right Ear:  External ear normal.   Left Ear:  External ear normal.   Nose:  Nose normal.   Eyes:  Conjunctivae and EOM are normal. Pupils are equal, round, and reactive.   Neck: Normal range of motion. Neck supple. No tracheal deviation present.   CV:  Normal heart sounds. No murmur heard.  Pulm/Chest: Effort normal and breath sounds normal.   Abd: Soft. No distension. There is no tenderness. There is no rigidity, no rebound and no guarding.   M/S: Normal range of motion.  No calf pain or swelling bilateral.  Neuro: Awake and alert. CN II-XII Grossly intact, no pronator drift, normal finger-nose-finger, visual fields intact by confrontation. Muscle strength is +5 proximal and distal in the bilateral upper and lower extremities. No dysarthria. Normal palm up, palm down.  Skin: Skin is warm and dry. No rash noted. Not diaphoretic.   Psych: Normal mood and affect. Behavior is normal.     Diagnostics     Lab Results   Labs Ordered and Resulted from Time of ED Arrival to Time of ED Departure   TROPONIN T, HIGH SENSITIVITY - Abnormal       Result Value    Troponin T, High Sensitivity 26 (*)    COMPREHENSIVE METABOLIC PANEL - Abnormal    Sodium 140      Potassium 3.8      Carbon Dioxide (CO2) 25      Anion Gap 11      Urea Nitrogen 9.9      Creatinine 0.89      GFR Estimate 80      Calcium 9.1      Chloride 104      Glucose 143 (*)     Alkaline Phosphatase 92      AST 21      ALT 11      Protein Total 7.5      Albumin 3.7      Bilirubin Total 0.7     CBC WITH PLATELETS AND DIFFERENTIAL - Abnormal    WBC Count 10.9      RBC Count 3.94      Hemoglobin 11.1 (*)     Hematocrit 34.9 (*)     MCV 89      MCH 28.2      MCHC 31.8      RDW 13.5      Platelet Count 248      % Neutrophils 68      % Lymphocytes 23      % Monocytes 6      % Eosinophils 1      % Basophils 1      % Immature Granulocytes 1      NRBCs per 100 WBC 0      Absolute Neutrophils 7.4       Absolute Lymphocytes 2.5      Absolute Monocytes 0.7      Absolute Eosinophils 0.2      Absolute Basophils 0.1      Absolute Immature Granulocytes 0.1      Absolute NRBCs 0.0     TROPONIN T, HIGH SENSITIVITY - Abnormal    Troponin T, High Sensitivity 28 (*)    D DIMER QUANTITATIVE - Abnormal    D-Dimer Quantitative 8.70 (*)    ROUTINE UA WITH MICROSCOPIC REFLEX TO CULTURE       Imaging   XR Chest 2 Views   Final Result   IMPRESSION: Enlarged cardiac silhouette. No acute infiltrates.          EKG   ECG results from 12/07/24   EKG 12 lead     Value    Systolic Blood Pressure     Diastolic Blood Pressure     Ventricular Rate 89    Atrial Rate 89    NE Interval 198    QRS Duration 76        QTc 484    P Axis 65    R AXIS 14    T Axis 99    Interpretation ECG      Sinus rhythm  Possible Left atrial enlargement  ST & T wave abnormality, consider lateral ischemia  Prolonged QT  Abnormal ECG  When compared with ECG of 11-Feb-2015 11:16,  ST now depressed in Lateral leads  Confirmed by GENERATED REPORT, COMPUTER ([id number]),  Maryann Roth ([id number]) on 12/7/2024 6:52:05 PM         Independent Interpretation   CXR: No pneumothorax or infiltrate.    ED Course      Medications Administered   Medications   labetalol (NORMODYNE/TRANDATE) injection 10 mg (10 mg Intravenous $Given 12/7/24 2016)   nitroGLYcerin (NITROSTAT) sublingual tablet 0.4 mg (0.4 mg Sublingual $Given 12/7/24 1955)       Procedures   Procedures     Discussion of Management   None    ED Course   ED Course as of 12/07/24 2135   Sat Dec 07, 2024   2105 I recommended admission.  The patient did not want to be admitted.  We discussed reasons why.  She want time to think about it.   2127 I talked with the patient further.  She strongly desires to go home.  She does not believe this is her heart.  She thinks it is musculoskeletal from lying on her side last night.  Discussed that certainly could cardiac still and additional testing will be necessary during  hospitalization, but she declined this.  I also discussed her blood pressure is quite high.  I discussed that she is at risk of heart attack, stroke, kidney disease/failure/needing dialysis, death, etc.  The patient reports she is on lisinopril twice a day and does have it at home.  She agrees to take her evening dose when she gets home.  Invited her to return at any time and that we be happy to see her and take care of her again.  She expressed verbal understanding and gratitude.  She still desired to leave the hospital AGAINST MEDICAL ADVICE.       Additional Documentation  None    Medical Decision Making / Diagnosis     CMS Diagnoses: None    MIPS       None    MDM   Edna Horton is a very pleasant 49 year old female right-sided chest pain.  The patient screening EKG did not show any acute ST changes, but perhaps there is very slight nonspecific ST changes.  Serial troponins are negative.  The patient was quite hypertensive.  Aside from the chest pain, there is no evidence of endorgan dysfunction.  She has no focal neurologic deficit to suggest stroke.  Creatinine is baseline for her.  She was provided a nitroglycerin which did not help her chest pain.  She is on provided labetalol which did help her blood pressure reduced.  She is on lisinopril twice a day.  She did not take it last night, this morning or this evening.  She is not short of breath and no PE risk factors. The chest xray was reviewed and shows no evidence of pneumothorax, infiltrate to suggest pneumonia, widened mediastinum to suggest aortic dissection, obvious rib fracture or free air under the diaphragm to suggest perforated viscous ulcer.  I recommended admission to the hospital for observation given her heart score of 4.  She strongly declined this.  We talked about reasons why and I gave her time to think about it.  She continues to desire to leave AGAINST MEDICAL ADVICE.  I discussed her pain may be musculoskeletal in nature, but  certainly could still be cardiac given her multiple risk factors.  She still desired to leave.  I invited her to return anytime and we would be happy to see her again and she agreed to do so.  Recommended close follow-up with her PCP.    The treatment plan was discussed with the patient and they expressed understanding of this plan and consented to the plan.  In addition, the patient will return to the emergency department if their symptoms persist, worsen, if new symptoms arise or if there is any concern as other pathology may be present that is not evident at this time. They also understand the importance of close follow up in the clinic and if unable to do so will return to the emergency department for a reevaluation. All questions were answered.    Disposition   The patient left AMA.     Diagnosis     ICD-10-CM    1. Hypertensive urgency  I16.0       2. Chest pain, unspecified type  R07.9            Discharge Medications   New Prescriptions    No medications on file         DO Stanislaw Lizarraga Robert James, DO  12/07/24 1363

## 2024-12-08 NOTE — ED PROVIDER NOTES
Patient had left AMA.  I had spoke to the hospitalist about admission.  He had added on a D-dimer to the patient's labs.  This resulted positive.  The patient had left the ED.  I called her on her cell phone.  I reviewed this abnormal result with her and discussed the need for a CT pulmonary embolism study to definitively assess for pulmonary embolism.  She agreed to come back to the ED, have an ultrasound-guided IV placed again and to undergo a CT PE study.  This has been ordered.  Care of the patient has been signed out to my colleague, Dr. Kay.     Balaji Dover, DO  12/07/24 [id number]

## 2024-12-08 NOTE — ED PROVIDER NOTES
Called patient to ensure she is going to be seen. No answer. VM left. Discussed need to be seen for the additional test as discussed on the phone earlier.     Balaji Dover, DO  12/07/24 1374

## 2024-12-08 NOTE — H&P
[facility]    History and Physical - Hospitalist Service       Date of Admission:  12/8/2024    Assessment & Plan      Edna Horton is a 49 year old female admitted on 12/8/2024.  Past medical history significant for hypertension with inconsistent treatment adherence, obesity, presented to emergency department for evaluation of right-sided chest pain, admitted for management of hypertensive urgency and the risks stratification for chest pain.        # Chest pain, mostly right sided  # Concern for pulmonary hypertension  -Had right-sided chest pain/pressure sensation the morning of 12/7/2024, this happened while she walked to use bathroom.  It lasted for couple of hours, per history positional.  Flat troponin, 25>> 28>>26, no acute ST segment change on EKG, no coronary calcification on CT scan, thus less concern for ACS at this time. CT also showed mild pulmonary artery enlargement, concerning for pulmonary hypertension. Given the above, reasonable to consult cardiology for risk stratification and further workup.  CT-PE done in the setting of right-sided chest pain, elevated D-dimer: 8.7, negative for PE.  Plan:  - TTE in the morning  - NPO mifnight incase ischemic workup in the morning  - Cardiology consulted, appreciate recs  - Will check lipid panel, A1c, and TSH    #Hypertensive urgency  # Elevated creatinine  Assessment: Patient does not take measure blood pressures at home, on lisinopril/HCTZ [10-12.5], takes medication inconsistently.-Establish PCP.  Unclear how her blood pressure is at home, but based on measurement yesterday and today her blood pressure is uncontrolled.  Plan:Optimize blood pressure control  - Will add amlodipine 10 mg, po, daily  - PRN hydralazine for SBP >170  - TTE in the AM  - Will check urine albumin/creatinine ratio to rule out proteinuria  - BMP in the morning    # Anemia: based on hgb <11, baseline appears to be 10-11, at baseline.  Reasonable to  do iron panel  Plan:  - Iron panel and ferritin ordered      Diet:  Regular diet, n.p.o. midnight for potential ischemic eval in the morning  DVT Prophylaxis: Ambulate every shift  Hill Catheter: Not present  Lines: None     Cardiac Monitoring: None  Code Status:  Full code    Clinically Significant Risk Factors Present on Admission                   # Hypertension: Noted on problem list                  Disposition Plan     Medically Ready for Discharge: Anticipated Tomorrow           Johnnie Barnhart MD  Hospitalist Service  [facility]  Securely message with [employer] (more info)  Text page via [employer] Paging/Directory     ______________________________________________________________________    Chief Complaint   Right-sided chest pain    History is obtained from the patient, ED provider, and chart review    History of Present Illness   Edna Horton is a 49 year old female admitted on 12/8/2024.  Past medical history significant for hypertension with inconsistent treatment adherence, obesity, presented to emergency department for evaluation of right-sided chest pain, admitted for management of hypertensive urgency and the risks start patient for chest pain.    Patient was seen for similar complaint yesterday, was recommended for admission for further workup, but left AGAINST MEDICAL ADVICE.  Further workup showed elevated D-dimer, patient was called and and came back today for further evaluation.     No fevers or chills, unintentional weight loss, lymphadenopathy, headache, paraesthesias, or weakness in a limb, shortness of breath or wheezing, arthralgias or myalgias, rashes or skin changes, odynophagia or dysphagia, nausea or vomiting, early satiety, abdominal pain, abdominal distension/bloating, diarrhea, constipation, BRBPR, hematochezia, melena, jaundice or scleral icterus        Past Medical History    No past medical history on file.    Past Surgical History   No past  surgical history on file.    Prior to Admission Medications   Prior to Admission Medications   Prescriptions Last Dose Informant Patient Reported? Taking?   lisinopril-hydrochlorothiazide (ZESTORETIC) 10-12.5 MG tablet 12/8/2024 Morning  Yes Yes   Sig: Take 1 tablet by mouth daily.      Facility-Administered Medications: None        Review of Systems    The 10 point Review of Systems is negative other than noted in the HPI or here.      Physical Exam   Vital Signs: Temp: 98  F (36.7  C) Temp src: Temporal BP: (!) 181/98 Pulse: 83   Resp: 29 SpO2: 95 %      Weight: 0 lbs 0 oz    General Appearance: Not in distress  Respiratory: Clear to auscultation bilaterally, no added breath sounds  Cardiovascular: S1 and S2 well heard, no murmur or gallop  GI: Nontender, nondistended, positive bowel sounds  Skin: No rash  CNS: Alert and oriented x 3, nonfocal      Medical Decision Making       79 MINUTES SPENT BY ME on the date of service doing chart review, history, exam, documentation & further activities per the note.      Data     I have personally reviewed the following data over the past 24 hrs:    9.8  \   10.6 (L)   / 236     140 102 12.1 /  230 (H)   3.6 26 1.12 (H) \     ALT: N/A AST: N/A AP: N/A TBILI: N/A   ALB: N/A TOT PROTEIN: N/A LIPASE: N/A     Trop: 25 (H) BNP: N/A     INR:  N/A PTT:  N/A   D-dimer:  N/A Fibrinogen:  N/A       Imaging results reviewed over the past 24 hrs:   Recent Results (from the past 24 hours)   XR Chest 2 Views    Narrative    EXAM: XR CHEST 2 VIEWS  LOCATION: [facility]  DATE: 12/7/2024    INDICATION: Chest pain.  COMPARISON: 2/11/2015.      Impression    IMPRESSION: Enlarged cardiac silhouette. No acute infiltrates.   CT Chest Pulmonary Embolism w Contrast    Narrative    EXAM: CT CHEST PULMONARY EMBOLISM WITH CONTRAST  LOCATION: [facility]  DATE: 12/08/2024    INDICATION: Shortness of breath, elevated D-dimer, PE; Female sex; Not  pregnant; No prior imaging in the last 24 hours; Pulmonary Embolism Rule Out Criteria (PERC) score not > 0.  COMPARISON: None.  TECHNIQUE: CT chest pulmonary angiogram during arterial phase injection of IV contrast. Multiplanar reformats and MIP reconstructions were performed. Dose reduction techniques were used.   CONTRAST: 80 mL Isovue 370.    FINDINGS:  ANGIOGRAM CHEST: Pulmonary arteries are negative for pulmonary emboli. Main pulmonary artery is enlarged at 3.4 cm. Thoracic aorta is negative for dissection. No CT evidence of right heart strain.    LUNGS AND PLEURA: Mosaic attenuation in the lungs which is nonspecific, question some air trapping. Mild bronchial wall thickening and bronchiectasis. Areas of cystic bronchiectasis in the right lower lobe.    MEDIASTINUM/AXILLAE: Mildly enlarged right hilar node which is nonspecific at 1.2 cm short axis. No aneurysm.    CORONARY ARTERY CALCIFICATION: None.    UPPER ABDOMEN: No acute findings.    MUSCULOSKELETAL: No frankly destructive bony lesions.      Impression    IMPRESSION:  1.  No pulmonary embolism demonstrated.  2.  Mildly enlarged pulmonary artery, this can be associated with pulmonary hypertension.  3.  Mosaic attenuation in the lungs, question areas of air trapping.

## 2024-12-09 ENCOUNTER — APPOINTMENT (OUTPATIENT)
Dept: CARDIOLOGY | Facility: CLINIC | Age: 49
DRG: 287 | End: 2024-12-09
Payer: COMMERCIAL

## 2024-12-09 LAB
ALBUMIN SERPL BCG-MCNC: 3.7 G/DL (ref 3.5–5.2)
ALP SERPL-CCNC: 89 U/L (ref 40–150)
ALT SERPL W P-5'-P-CCNC: 10 U/L (ref 0–50)
ANION GAP SERPL CALCULATED.3IONS-SCNC: 10 MMOL/L (ref 7–15)
AST SERPL W P-5'-P-CCNC: 17 U/L (ref 0–45)
BILIRUB DIRECT SERPL-MCNC: <0.2 MG/DL (ref 0–0.3)
BILIRUB SERPL-MCNC: 0.9 MG/DL
BUN SERPL-MCNC: 11.6 MG/DL (ref 6–20)
CALCIUM SERPL-MCNC: 9.2 MG/DL (ref 8.8–10.4)
CHLORIDE SERPL-SCNC: 99 MMOL/L (ref 98–107)
CREAT SERPL-MCNC: 0.89 MG/DL (ref 0.51–0.95)
EGFRCR SERPLBLD CKD-EPI 2021: 80 ML/MIN/1.73M2
ERYTHROCYTE [DISTWIDTH] IN BLOOD BY AUTOMATED COUNT: 13.7 % (ref 10–15)
GLUCOSE BLDC GLUCOMTR-MCNC: 159 MG/DL (ref 70–99)
GLUCOSE BLDC GLUCOMTR-MCNC: 210 MG/DL (ref 70–99)
GLUCOSE SERPL-MCNC: 183 MG/DL (ref 70–99)
HCO3 SERPL-SCNC: 26 MMOL/L (ref 22–29)
HCT VFR BLD AUTO: 33.6 % (ref 35–47)
HGB BLD-MCNC: 11.1 G/DL (ref 11.7–15.7)
LVEF ECHO: NORMAL
MCH RBC QN AUTO: 28.8 PG (ref 26.5–33)
MCHC RBC AUTO-ENTMCNC: 33 G/DL (ref 31.5–36.5)
MCV RBC AUTO: 87 FL (ref 78–100)
PLATELET # BLD AUTO: 218 10E3/UL (ref 150–450)
POTASSIUM SERPL-SCNC: 3.7 MMOL/L (ref 3.4–5.3)
PROT SERPL-MCNC: 7.3 G/DL (ref 6.4–8.3)
RBC # BLD AUTO: 3.85 10E6/UL (ref 3.8–5.2)
SODIUM SERPL-SCNC: 135 MMOL/L (ref 135–145)
WBC # BLD AUTO: 9.1 10E3/UL (ref 4–11)

## 2024-12-09 PROCEDURE — 99223 1ST HOSP IP/OBS HIGH 75: CPT | Mod: 25 | Performed by: INTERNAL MEDICINE

## 2024-12-09 PROCEDURE — 255N000002 HC RX 255 OP 636

## 2024-12-09 PROCEDURE — 99233 SBSQ HOSP IP/OBS HIGH 50: CPT | Performed by: NURSE PRACTITIONER

## 2024-12-09 PROCEDURE — 250N000013 HC RX MED GY IP 250 OP 250 PS 637

## 2024-12-09 PROCEDURE — 84155 ASSAY OF PROTEIN SERUM: CPT

## 2024-12-09 PROCEDURE — 82947 ASSAY GLUCOSE BLOOD QUANT: CPT

## 2024-12-09 PROCEDURE — 85027 COMPLETE CBC AUTOMATED: CPT

## 2024-12-09 PROCEDURE — 36415 COLL VENOUS BLD VENIPUNCTURE: CPT

## 2024-12-09 PROCEDURE — 93306 TTE W/DOPPLER COMPLETE: CPT | Mod: 26 | Performed by: INTERNAL MEDICINE

## 2024-12-09 PROCEDURE — G0378 HOSPITAL OBSERVATION PER HR: HCPCS

## 2024-12-09 PROCEDURE — 80076 HEPATIC FUNCTION PANEL: CPT

## 2024-12-09 PROCEDURE — 250N000011 HC RX IP 250 OP 636: Performed by: INTERNAL MEDICINE

## 2024-12-09 PROCEDURE — 82962 GLUCOSE BLOOD TEST: CPT

## 2024-12-09 PROCEDURE — 96375 TX/PRO/DX INJ NEW DRUG ADDON: CPT

## 2024-12-09 RX ORDER — ATORVASTATIN CALCIUM 40 MG/1
40 TABLET, FILM COATED ORAL DAILY
Qty: 30 TABLET | Refills: 0 | Status: SHIPPED | OUTPATIENT
Start: 2024-12-09

## 2024-12-09 RX ORDER — HYDRALAZINE HYDROCHLORIDE 25 MG/1
25 TABLET, FILM COATED ORAL EVERY 8 HOURS PRN
Qty: 30 TABLET | Refills: 0 | Status: SHIPPED | OUTPATIENT
Start: 2024-12-09 | End: 2024-12-11

## 2024-12-09 RX ORDER — HYDRALAZINE HYDROCHLORIDE 20 MG/ML
10 INJECTION INTRAMUSCULAR; INTRAVENOUS ONCE
Status: COMPLETED | OUTPATIENT
Start: 2024-12-09 | End: 2024-12-09

## 2024-12-09 RX ORDER — LISINOPRIL AND HYDROCHLOROTHIAZIDE 10; 12.5 MG/1; MG/1
1 TABLET ORAL DAILY
Qty: 30 TABLET | Refills: 0 | Status: SHIPPED | OUTPATIENT
Start: 2024-12-09 | End: 2024-12-11

## 2024-12-09 RX ORDER — AMLODIPINE BESYLATE 10 MG/1
10 TABLET ORAL DAILY
Qty: 30 TABLET | Refills: 0 | Status: SHIPPED | OUTPATIENT
Start: 2024-12-10

## 2024-12-09 RX ADMIN — PERFLUTREN 2 ML: 6.52 INJECTION, SUSPENSION INTRAVENOUS at 15:18

## 2024-12-09 RX ADMIN — LISINOPRIL AND HYDROCHLOROTHIAZIDE 1 TABLET: 12.5; 1 TABLET ORAL at 08:37

## 2024-12-09 RX ADMIN — ACETAMINOPHEN 325 MG: 325 TABLET, FILM COATED ORAL at 13:41

## 2024-12-09 RX ADMIN — HYDRALAZINE HYDROCHLORIDE 10 MG: 20 INJECTION INTRAMUSCULAR; INTRAVENOUS at 19:53

## 2024-12-09 RX ADMIN — HYDRALAZINE HYDROCHLORIDE 25 MG: 25 TABLET ORAL at 15:54

## 2024-12-09 RX ADMIN — AMLODIPINE BESYLATE 10 MG: 10 TABLET ORAL at 08:37

## 2024-12-09 ASSESSMENT — ACTIVITIES OF DAILY LIVING (ADL)
ADLS_ACUITY_SCORE: 33
ADLS_ACUITY_SCORE: 41

## 2024-12-09 NOTE — CONSULTS
Consult placed for insurance/financial concerns. Financial counseling is involved and following, no needs for care management at this time.    LYNNETTE Becerra  Social Work  [facility]

## 2024-12-09 NOTE — PROGRESS NOTES
[facility]    Medicine Progress Note - Hospitalist Service    Date of Admission:  12/8/2024    Assessment & Plan      Edna Horton is a 49 year old female started to observation on 12/8/2024 for right-sided chest pain and uncontrolled hypertension.  AMH is significant for hypertension with inconsistent treatment adherence, obesity, presented to emergency department for evaluation of right-sided chest pain, admitted for management of hypertensive urgency and the risks stratification for chest pain.    Chest pain, mostly right sided  Concern for pulmonary hypertension by CT angiogram  Hypercholesterolemia, new diagnosis with abnormal lipid panel  *Had right-sided chest pain/pressure sensation the morning of 12/7/2024, this happened while she walked to use bathroom.  It lasted for couple of hours, per history positional.   *Patient ruled out for PE by CT angiogram chest 12/8/2024  * Flat troponin, 25>> 28>>26, no acute ST segment change on EKG, no coronary calcification on CT scan but also showed mild pulmonary artery enlargement, concerning for pulmonary hypertension.   - TTE 12/09/24 as noted below  - Cardiology consulted personally discussed management with the attending cardiology physician who is recommending stress test on 12/10/2024.  - As of 5:26 PM 12/09/24 patient is unsure if she will stay for that test for not.  If she decides to leave before completing her stress test on 12/10/2024 it would be AGAINST MEDICAL ADVICE.  In the event that occurs I have prescribed her antihypertensive, lipid-lowering agent that can be provided to her when she leaves.  I have counseled patient about potential premature death and other adverse outcomes should she decide to leave AMA  -  lipid panel abnormal, starting high intensity atorvastatin 40 mg daily  - TSH WNL    Hypertensive urgency  Elevated creatinine now resolved  *PTA lisinopril/HCTZ [10-12.5], takes medication inconsistently,  does not have a PCP  -Needs to obtain blood pressures cuff for home measurements at home   -counseled regarding importance of establish PCP.    - amlodipine 10 mg, po, daily new this admission  - PRN p.o. hydralazine for SBP >170  - TTE as above  - Further workup in the outpatient setting  -Counseled regarding importance of tobacco sensation, lifestyle changes including but not limited dietary changes, reduction of sodium intake including reduction of fast food intake, increased exercise  -Nutrition consult for healthier tasty food choices    Tobacco use, active  Marijuana use  -Counseled patient at length regarding importance of smoking cessation as this increases her risk for cardiovascular and neurovascular disease  -Patient is ready to quit encouraged to pursue it simultaneously with her     Anemia: based on hgb <11, baseline appears to be 10-11   Suspect gynecologic/menstrual blood loss with reports of menorrhagia  * Iron panel and ferritin WNL  -OP referral to gynecology for menorrhagia management  -Will also need referral for age-related cancer screening for colonoscopy at OP follow up as part of anemia evaluation    T2DM 6.8% A1C 12/8/24  Recent Labs   Lab 12/09/24  0639 12/08/24  1425 12/07/24  1721   * 230* 143*     -not on meds PTA  -POC gluc tid ac and HS  -As above discussed role of exercise, dietary changes, smoking cessation and weight loss all will help optimize her diabetic control  -Defer initiation of med management to PCP    Diet:  Regular diet, n.p.o. midnight for potential ischemic eval in the morning  DVT Prophylaxis: Ambulate every shift  Hill Catheter: Not present  Lines: None     Cardiac Monitoring: None  Code Status:  Full code       Observation Goals: -diagnostic tests and consults completed and resulted, -vital signs normal or at patient baseline, Nurse to notify provider when observation goals have been met and patient is ready for discharge.  Diet: Low Saturated Fat Na  <2400 mg    DVT Prophylaxis: Pneumatic Compression Devices and Ambulate every shift  Hill Catheter: Not present  Lines: None     Cardiac Monitoring: None  Code Status: Full Code      Clinically Significant Risk Factors Present on Admission                   # Hypertension: Noted on problem list          # DMII: A1C = 6.8 % (Ref range: <5.7 %) within past 6 months               Social Drivers of Health    Food Insecurity: Unknown (12/8/2024)    Food Insecurity     Within the past 12 months, did you worry that your food would run out before you got money to buy more?: Patient declined     Within the past 12 months, did the food you bought just not last and you didn t have money to get more?: Patient declined   Housing Stability: Unknown (12/8/2024)    Housing Stability     Do you have housing? : Patient declined     Are you worried about losing your housing?: Patient declined   Tobacco Use: High Risk (8/27/2024)    Patient History     Smoking Tobacco Use: Every Day     Smokeless Tobacco Use: Unknown   Financial Resource Strain: Unknown (12/8/2024)    Financial Resource Strain     Within the past 12 months, have you or your family members you live with been unable to get utilities (heat, electricity) when it was really needed?: Patient declined   Transportation Needs: Unknown (12/8/2024)    Transportation Needs     Within the past 12 months, has lack of transportation kept you from medical appointments, getting your medicines, non-medical meetings or appointments, work, or from getting things that you need?: Patient declined          Disposition Plan     Medically Ready for Discharge: Anticipated Tomorrow cardiology recommendations       The patient's care was discussed with the Attending Physician, Dr. Santizo, Patient, and cardiology Consultant(s).    EDNA Cope [facility]  Hospitalist Service  [facility]  Securely message with [employer] (more info)  Text page via [facility] Paging/Directory    ______________________________________________________________________    Interval History   Patient required as needed dose x 1 of hydralazine.  Patient denies any chest pain headache back pain.  Reports she does not have a blood pressure cuff at home.  recently lost insurance.  She does know that she is diabetic as of last year.  still smoking cigarettes several per day also smokes marijuana.  She was planning to quit January 1, 2025 with her .  She attributes her right chest pain to sleeping on her right side with her arm bent.  She does report 1 episode of hemoptysis 6 days prior to admission without recurrence.  We discussed that she had been ruled out for PE which she was concerned about.  Endorses heavy menstrual periods denies melena.  Patient reports she often eats that fast food restaurants.  She does not have primary provider    Physical Exam   Vital Signs: Temp: 98.5  F (36.9  C) Temp src: Oral BP: (!) 176/93 Pulse: 88   Resp: 18 SpO2: 95 % O2 Device: None (Room air)    Weight: 264 lbs 6.4 oz    Constitutional: vs as per EMR  General:  adult pt lying in bed without acute distress  Neuro: +follows commands wiggle toes and show 2 fingers bilat, face symmetric, tongue midline, speech fluent  Eyes pupils equal round 3mm briskly reactive bilat, sclera nonicteric, noninjected, conjunctiva pink,  Head, ENT & mouth: NC/AT,  mouth moist oral mucosa  Neck: supple  CV S1S2 no murmurs  resp: CTAB upper and lower lobes  gi:normoactive bowel sounds, soft, nontender, nondisteded  Ext: no edema or mottling  Skin: no rashes on exposed skin  Musculoskeletal no bony joint deformities      Medical Decision Making       50 MINUTES SPENT BY ME on the date of service doing chart review, history, exam, documentation & further activities per the note.      Data     I have personally reviewed the following data over the past 24 hrs:    9.1  \   11.1 (L)   / 218     135 99 11.6 /  183 (H)   3.7 26 0.89 \     ALT: 10 AST:  17 AP: 89 TBILI: 0.9   ALB: 3.7 TOT PROTEIN: 7.3 LIPASE: N/A     Trop: 27 (H) BNP: N/A     TSH: 1.75 T4: N/A A1C: N/A     Ferritin:  N/A % Retic:  N/A LDH:  N/A       Imaging results reviewed over the past 24 hrs:   Recent Results (from the past 24 hours)   Echocardiogram Complete   Result Value    LVEF  55%    Narrative    [id number]  [id number]  [id number]  [id number]^ANA ROSA^NAZARIO     [facility]  Echocardiography Laboratory  [address] [place] [id number]     Name: MARITZA NEELY  MRN: [id number]  : 1975  Study Date: 2024 02:53 PM  Age: 49 yrs  Gender: Female  Patient [facility]: [facility]  Reason For Study: Pulmonary Hypertension  Ordering Physician: NAZARIO OSEGUERA  Referring Physician: None  Performed By: Kaitlynn Turner RDCS     BSA: 2.3 m2  Height: 66 in  Weight: 264 lb  HR: 81  BP: 175/107 mmHg  ______________________________________________________________________________  Procedure  Complete Portable Echo Adult. Definity (NDC #72249-275) given intravenously.  ______________________________________________________________________________  Interpretation Summary     1. The left ventricle is normal in size. The visual ejection fraction is  estimated at 55%.  2. There is mild to moderate concentric left ventricular hypertrophy.  3. The right ventricle is normal in structure, function and size.  4. No tricuspid regurgitation. Right ventricular systolic pressure could not  be approximated due to inadequate tricuspid regurgitation.     No previous echo for comparison.  ______________________________________________________________________________  Left Ventricle  The left ventricle is normal in size. There is mild to moderate concentric  left ventricular hypertrophy. The visual ejection fraction is estimated at  55%. Left ventricular diastolic function is normal. Normal left ventricular  wall motion.     Right Ventricle  The right ventricle is normal in  structure, function and size.     Atria  Normal left atrial size. Right atrial size is normal. There is no atrial shunt  seen.     Mitral Valve  The mitral valve is normal in structure and function.     Tricuspid Valve  No tricuspid regurgitation. Right ventricular systolic pressure could not be  approximated due to inadequate tricuspid regurgitation.     Aortic Valve  There is trace aortic regurgitation.     Pulmonic Valve  The pulmonic valve is normal in structure and function.     Vessels  Normal ascending, transverse (arch), and descending aorta. The inferior vena  cava was normal in size with preserved respiratory variability.     Pericardium  There is no pericardial effusion.     Rhythm  Sinus rhythm was noted.  ______________________________________________________________________________  MMode/2D Measurements & Calculations  IVSd: 1.5 cm     LVIDd: 5.1 cm  LVIDs: 3.4 cm  LVPWd: 1.5 cm  FS: 32.9 %  LV mass(C)d: 337.5 grams  LV mass(C)dI: 150.0 grams/m2  Ao root diam: 3.7 cm  LA dimension: 4.2 cm  asc Aorta Diam: 3.7 cm  LA/Ao: 1.1  Ao root diam index Ht(cm/m): 2.2  Ao root diam index BSA (cm/m2): 1.6  Asc Ao diam index BSA (cm/m2): 1.6  Asc Ao diam index Ht(cm/m): 2.2  LA Volume (BP): 93.2 ml     LA Volume Index (BP): 41.4 ml/m2  RWT: 0.59  TAPSE: 2.2 cm     Doppler Measurements & Calculations  MV E max earl: 91.2 cm/sec  MV A max earl: 54.5 cm/sec  MV E/A: 1.7  MV dec time: 0.18 sec  PA acc time: 0.16 sec  E/E' av.4  Lateral E/e': 16.3  Medial E/e': 16.5  RV S Earl: 13.7 cm/sec     ______________________________________________________________________________  Report approved by: Baldemar Hoyos MD on 2024 03:49 PM

## 2024-12-09 NOTE — PROGRESS NOTES
Observation goals  PRIOR TO DISCHARGE        Comments: -diagnostic tests and consults completed and resulted: Not met  -vital signs normal or at patient baseline: Partially met          Rested well overnight. Blood pressures improved. PRN Hydralazine given x1 during the evening. Denies pain other then a headache, Tylenol given. Ambulating independently. NPO since midnight. Echo ordered. Cardiology consulted.

## 2024-12-09 NOTE — DISCHARGE SUMMARY
"[facility]  Hospitalist Discharge Summary      Date of Admission:  12/8/2024  Date of Discharge:  {DISCHARGE DATE:888452}  Discharging Provider: EDNA Cope CNP  Discharge Service: Hospitalist Service    Discharge Diagnoses   ***    Clinically Significant Risk Factors     # DMII: A1C = 6.8 % (Ref range: <5.7 %) within past 6 months       Follow-ups Needed After Discharge   Follow-up Appointments       Follow-up and recommended labs and tests       Follow up with primary care provider (you need to establish care with a primary provider), Physician No Ref-Primary, within 7 days to evaluate medication change, for hospital follow- up, and regarding new diagnosis.  The following labs/tests are recommended: BMP, CBC.  Also consider referral for gynecology for menorrhagia management and colonoscopy for age-related cancer screening for which she is overdue but can also be evaluated as part of her anemia workup            {Additional follow-up instructions/to-do's for PCP    :***}    Unresulted Labs Ordered in the Past 30 Days of this Admission       No orders found for last 31 day(s).        These results will be followed up by ***    Discharge Disposition   {Discharge to:8981833::\"Discharged to home\"}  Condition at discharge: {CONDITION:667564::\"Stable\"}    Hospital Course   {OPTIONAL -- use to select A&P section   :938567}    Consultations This Hospital Stay   CARDIOLOGY IP CONSULT  NUTRITION SERVICES ADULT IP CONSULT  CARE MANAGEMENT / SOCIAL WORK IP CONSULT    Code Status   Full Code    Time Spent on this Encounter   {How much time did you spend on discharge?:89131588}       EDNA Cope CNP  [facility] EXTENDED RECOVERY AND SHORT STAY  [address] [facility] [address]  Phone: [phone]  ______________________________________________________________________    Physical Exam   Vital Signs: Temp: 98.5  F (36.9  C) Temp src: Oral BP: (!) 176/93 " Pulse: 88   Resp: 18 SpO2: 95 % O2 Device: None (Room air)    Weight: 264 lbs 6.4 oz  {Recommend personal SmartPhrase or Notewriter for exam (OPTIONAL)   :171732}       Primary Care Physician   Physician No Ref-Primary    Discharge Orders      Reason for your hospital stay    You were hospitalized for right-sided chest pain. You ruled out for pulmonary embolism.  Echocardiogram was obtained was normal you had elevated cardiac labs and were seen in consultation by cardiology.  Your blood pressure was very high.  You were started on a new blood pressure medicine in addition to what you are already taking.  Management of anemia and diabetes was also discussed.  You were encouraged to stop smoking both cigarettes and marijuana.     Activity    Your activity upon discharge: activity as tolerated     Monitor and record    Blood pressure: daily  -Obtain a blood pressure cuff you can get this at a local pharmacy such as [facility] also places such as [employer], [facility] or even [facility]  -Check your blood pressure at home using the following instructions:  upon waking in the morning, go to the bathroom, sit with feet on floor, arm supported, no eating, drink or talking and rest for 3-5 minutes.  Then take your blood pressure.  Keep a log of the blood pressure readings and take it with you to your next primary care appointment.     Follow-up and recommended labs and tests     Follow up with primary care provider (you need to establish care with a primary provider), Physician No Ref-Primary, within 7 days to evaluate medication change, for hospital follow- up, and regarding new diagnosis.  The following labs/tests are recommended: BMP, CBC.  Also consider referral for gynecology for menorrhagia management and colonoscopy for age-related cancer screening for which she is overdue but can also be evaluated as part of her anemia workup     Diet    Follow this diet upon discharge:     Low Saturated Fat 2 g/day sodium diet restriction,  moderate carbohydrate diet.  You were counseled to stop eating fast food       Significant Results and Procedures   {Data for Discharge Summary:802885}    Discharge Medications   Current Discharge Medication List        START taking these medications    Details   amLODIPine (NORVASC) 10 MG tablet Take 1 tablet (10 mg) by mouth daily.  Qty: 30 tablet, Refills: 0    Associated Diagnoses: Hypertensive urgency      atorvastatin (LIPITOR) 40 MG tablet Take 1 tablet (40 mg) by mouth daily.  Qty: 30 tablet, Refills: 0    Associated Diagnoses: Chest pain, unspecified type; Hypercholesteremia      hydrALAZINE (APRESOLINE) 25 MG tablet Take 1 tablet (25 mg) by mouth every 8 hours as needed for high blood pressure (SBP >170).  Qty: 30 tablet, Refills: 0    Associated Diagnoses: Hypertensive urgency           CONTINUE these medications which have CHANGED    Details   lisinopril-hydrochlorothiazide (ZESTORETIC) 10-12.5 MG tablet Take 1 tablet by mouth daily.  Qty: 30 tablet, Refills: 0    Comments: Future refills by PCP  Physician No Ref-Primary with phone number None.  Associated Diagnoses: Hypertensive urgency           Allergies   No Known Allergies

## 2024-12-09 NOTE — CONSULTS
[facility]    Cardiology Consult Note- Cardiology    Date of Admission:  12/8/2024  Reason for Consult: Atypical chest pain dilated pulmonary artery    History of Present Illness   Edna Horton is a 49 year old female admitted on 12/8/2024. She admitted for atypical chest pain and a possible abnormal CAT scan.  Today is Monday.  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  She states that she was coughing and then she was not sure if she coughed up blood or at least something red or she spit up something red the chest pain continued throughout that day better on Thursday and Friday.  There was some issue with the police and her son which I think occurred on Saturday and then she again coughed up a piece of meat that she aspirated and she thinks there was blood then also.  She came to the ER on Saturday when she again had coughing pain in the right chest near the rib and sternal border without radiation the pain was worse with the cough not necessarily with body position but perhaps worse with deep breaths but otherwise not  positional.  Her troponins were flat EKG to my eye look like LVH with strain pattern and left atrial enlargement stable on the seventh and eighth of the month but there was a EKG in 2015 which I looked at the did not show LVH strain  .  I believe she signed out AMA from the ER Saturday but was called back  A CT scan was done Sunday to rule out pulmonary embolism because of her history of cough and perhaps blood in because the D-dimer came back elevated.  That CAT scan was reported as negative for pulm embolism but they saw some mosaic changes in her lung and she is a smoker and they thought the pulmonary arteries were mildly enlarged    We are called to evaluate her for what might be pulmonary hypertension and her atypical chest pain.  She states she sees all  of the chest pain on and off not necessarily exertional perhaps worse with a deep breath    Of importance she has a history of hypertension she freely admits she has not been taking her blood pressure pills she does not have a regular doctor she thinks she was last prescribed the pills in August with a 1 month supply    Assessment & Plan: HVSL   1.  I reviewed the echocardiogram she has LVH left atrial enlargement aortic root upper limits of normal in size this is most consistent with hypertensive heart disease which would fit best with her clinical story.  The right heart is normal they were not able to estimate right ventricular systolic pressure since there was no TR but the right ventricle appears normal in size the pulmonary artery acceleration time was normal which points against elevated PvR the IVC was normal in size.  The wall motion was normal in the left ventricle    I do note that she is chronically anemic but this goes back a number of years it is a mild normochromic normocytic anemia.   .  Iron studies were done here they are normal.  Vitamin studies were not done.  As mentioned above serial troponins x 4 over the course of 2 days were all minimally elevated at top-normal with no significant delta.    TSH was normal blood pressure remains quite elevated here.  Hemoglobin A1c would suggest that she is indeed diabetic hemoglobin A1c is just 6.8  .  Lipid values show low HDL and slightly high LDL especially given that she is technically diabetic her triglyceride numbers were normal    My suspicion is low that this is an acute coronary syndrome the original question us was regarding pulmonary hypertension because the CAT scan suggested the pulmonary arteries are mildly enlarged please see my discussion above regarding the echo although we do not have an estimate RVSP from the echo all the other peripheral I associations with pulm hypertension are not seen.  At this juncture I am going to recommend a  nuclear stress test tomorrow she does not [employer] want to stay in the hospital and I left it up to her if she wishes to but we will plan on doing an exercise nuclear test tomorrow to look for cardiac ischemia in a patient who has EKG showing LV strain pattern with ST segment changes from that who also has diabetes hypertension and hyperlipidemia and tobacco his underlying risk factors    She is already been started on hydralazine and amlodipine for her blood pressure.  Along with lisinopril HCT.  Those medications can slowly be adjusted upward particular the lisinopril in the hydralazine.  After the stress test if her blood pressure still elevated I would recommend adding nebivolol  Froilan Samano MD  ______________________________________________________________________    Past Medical History    Newly diagnosed hyperlipidemia, medical noncompliance, diabetes mellitus.   ongoing hypertension    Past Surgical History   No past surgical history on file.    Family History   No family history on file.     Social History   Social History   She smokes less than a pack a week she is  she works in the security business.    Socioeconomic History    Marital status: Single   Tobacco Use    Smoking status: Every Day     Types: Cigarettes     Social Drivers of Health     Financial Resource Strain: Unknown (12/8/2024)    Financial Resource Strain     Within the past 12 months, have you or your family members you live with been unable to get utilities (heat, electricity) when it was really needed?: Patient declined   Food Insecurity: Unknown (12/8/2024)    Food Insecurity     Within the past 12 months, did you worry that your food would run out before you got money to buy more?: Patient declined     Within the past 12 months, did the food you bought just not last and you didn t have money to get more?: Patient declined   Transportation Needs: Unknown (12/8/2024)    Transportation Needs     Within the past 12 months, has  lack of transportation kept you from medical appointments, getting your medicines, non-medical meetings or appointments, work, or from getting things that you need?: Patient declined   Housing Stability: Unknown (12/8/2024)    Housing Stability     Do you have housing? : Patient declined     Are you worried about losing your housing?: Patient declined        Medications   Medications Prior to Admission   Medication Sig Dispense Refill Last Dose/Taking    [DISCONTINUED] lisinopril-hydrochlorothiazide (ZESTORETIC) 10-12.5 MG tablet Take 1 tablet by mouth daily.   12/8/2024 Morning       Allergies    No Known Allergies     Review of Systems    Skin: Denies current rash  Eyes: No eye problems  ENT: No throat problems  Respiratory: Unclear if she has any shortness of breath she does not admit to it now but she did talk about a cough as above  Cardiovascular: No known heart disease except for this admission  Gastroenterology: No known GI problem  Genitourinary: No   Musculoskeletal: She does admit to some muscle or slight orthopedic problems  Neurologic: No stroke  Psychiatric: Negative  Heme/Lymph/Imm: Chronic normocytic anemia  Endocrine: Diabetes mellitus.   -Diabetic diet  -Continue home diabetes regimen  -Start sliding-scale insulin diagnosis admission    Physical Exam   Vitals: BP (!) 176/93 (BP Location: Right arm)   Pulse 88   Temp 98.5  F (36.9  C) (Oral)   Resp 18   Wt 119.9 kg (264 lb 6.4 oz)   SpO2 95%   BMI 42.68 kg/m    Constitutional: Alert and oriented  Skin: No rash no xanthoma  Head: No trauma  Eyes: Nonicteric  Lymph: No cervical lymph node  ENT: Not examined  Neck: Thick neck which makes exam difficult no obvious carotid bruits thyromegaly masses  Respiratory: Clear lung fields  Cardiac: Distant heart tones regular rhythm no obvious murmur rub  GI: Obese abdomen makes exam difficult no obvious masses organomegaly bruits  Extremities and Muscular Skeletal: No edema no cords no  clot  Neurological: Nonfocal negative  Psych: neg    Medical Decision Making       This was a 60-minute consult I also talked to the internist today to map out a plan as outlined above with nuclear stress test tomorrow she was just started on antihypertensives we could add nebivolol later after the stress test if her blood pressure still high a statin is reasonable to add since she is diabetic with mild lipid issues    Data     I have personally reviewed the following data over the past 24 hrs:    9.1  \   11.1 (L)   / 218     135 99 11.6 /  183 (H)   3.7 26 0.89 \     ALT: 10 AST: 17 AP: 89 TBILI: 0.9   ALB: 3.7 TOT PROTEIN: 7.3 LIPASE: N/A     Trop: 27 (H) BNP: N/A     TSH: 1.75 T4: N/A A1C: N/A     Ferritin:  N/A % Retic:  N/A LDH:  N/A         Imaging results reviewed over the past 24 hrs:   Recent Results (from the past 24 hours)   Echocardiogram Complete   Result Value    LVEF  55%    Narrative    [id number]  [id number]  [id number]^ANA ROSA^NAZARIO     [facility]  Echocardiography Laboratory  [address] [place] [address]     Name: MARITZA NEELY  MRN: [id number]  : 1975  Study Date: 2024 02:53 PM  Age: 49 yrs  Gender: Female  Patient Location: [facility]  Reason For Study: Pulmonary Hypertension  Ordering Physician: NAZARIO OSEGUERA  Referring Physician: None  Performed By: Kaitlynn Turner RDCS     BSA: 2.3 m2  Height: 66 in  Weight: 264 lb  HR: 81  BP: 175/107 mmHg  ______________________________________________________________________________  Procedure  Complete Portable Echo Adult. Definity (NDC #04235-213) given intravenously.  ______________________________________________________________________________  Interpretation Summary     1. The left ventricle is normal in size. The visual ejection fraction is  estimated at 55%.  2. There is mild to moderate concentric left ventricular hypertrophy.  3. The right ventricle is normal in structure,  function and size.  4. No tricuspid regurgitation. Right ventricular systolic pressure could not  be approximated due to inadequate tricuspid regurgitation.     No previous echo for comparison.  ______________________________________________________________________________  Left Ventricle  The left ventricle is normal in size. There is mild to moderate concentric  left ventricular hypertrophy. The visual ejection fraction is estimated at  55%. Left ventricular diastolic function is normal. Normal left ventricular  wall motion.     Right Ventricle  The right ventricle is normal in structure, function and size.     Atria  Normal left atrial size. Right atrial size is normal. There is no atrial shunt  seen.     Mitral Valve  The mitral valve is normal in structure and function.     Tricuspid Valve  No tricuspid regurgitation. Right ventricular systolic pressure could not be  approximated due to inadequate tricuspid regurgitation.     Aortic Valve  There is trace aortic regurgitation.     Pulmonic Valve  The pulmonic valve is normal in structure and function.     Vessels  Normal ascending, transverse (arch), and descending aorta. The inferior vena  cava was normal in size with preserved respiratory variability.     Pericardium  There is no pericardial effusion.     Rhythm  Sinus rhythm was noted.  ______________________________________________________________________________  MMode/2D Measurements & Calculations  IVSd: 1.5 cm     LVIDd: 5.1 cm  LVIDs: 3.4 cm  LVPWd: 1.5 cm  FS: 32.9 %  LV mass(C)d: 337.5 grams  LV mass(C)dI: 150.0 grams/m2  Ao root diam: 3.7 cm  LA dimension: 4.2 cm  asc Aorta Diam: 3.7 cm  LA/Ao: 1.1  Ao root diam index Ht(cm/m): 2.2  Ao root diam index BSA (cm/m2): 1.6  Asc Ao diam index BSA (cm/m2): 1.6  Asc Ao diam index Ht(cm/m): 2.2  LA Volume (BP): 93.2 ml     LA Volume Index (BP): 41.4 ml/m2  RWT: 0.59  TAPSE: 2.2 cm     Doppler Measurements & Calculations  MV E max jessica: 91.2 cm/sec  MV A max  earl: 54.5 cm/sec  MV E/A: 1.7  MV dec time: 0.18 sec  PA acc time: 0.16 sec  E/E' av.4  Lateral E/e': 16.3  Medial E/e': 16.5  RV S Earl: 13.7 cm/sec     ______________________________________________________________________________  Report approved by: Baldemar Hoyos MD on 2024 03:49 PM               Clinically Significant Risk Factors Present on Admission                   # Hypertension: Noted on problem list          # DMII: A1C = 6.8 % (Ref range: <5.7 %) within past 6 months

## 2024-12-10 ENCOUNTER — APPOINTMENT (OUTPATIENT)
Dept: NUCLEAR MEDICINE | Facility: CLINIC | Age: 49
DRG: 287 | End: 2024-12-10
Attending: INTERNAL MEDICINE

## 2024-12-10 PROBLEM — R94.39 ABNORMAL CARDIOVASCULAR STRESS TEST: Status: ACTIVE | Noted: 2024-12-10

## 2024-12-10 PROBLEM — E78.00 HYPERCHOLESTEREMIA: Status: ACTIVE | Noted: 2024-12-10

## 2024-12-10 PROBLEM — I21.4 NSTEMI (NON-ST ELEVATED MYOCARDIAL INFARCTION) (H): Status: ACTIVE | Noted: 2024-12-10

## 2024-12-10 LAB
CV STRESS MAX HR HE: 96
GLUCOSE BLDC GLUCOMTR-MCNC: 196 MG/DL (ref 70–99)
GLUCOSE BLDC GLUCOMTR-MCNC: 196 MG/DL (ref 70–99)
GLUCOSE BLDC GLUCOMTR-MCNC: 218 MG/DL (ref 70–99)
GLUCOSE BLDC GLUCOMTR-MCNC: 225 MG/DL (ref 70–99)
NUC STRESS EJECTION FRACTION: 54 %
RATE PRESSURE PRODUCT: NORMAL
STRESS ECHO BASELINE DIASTOLIC HE: 96
STRESS ECHO BASELINE HR: 78 BPM
STRESS ECHO BASELINE SYSTOLIC BP: 153
STRESS ECHO CALCULATED PERCENT HR: 56 %
STRESS ECHO LAST STRESS DIASTOLIC BP: 82
STRESS ECHO LAST STRESS SYSTOLIC BP: 152
STRESS ECHO TARGET HR: 172

## 2024-12-10 PROCEDURE — 78452 HT MUSCLE IMAGE SPECT MULT: CPT

## 2024-12-10 PROCEDURE — 210N000002 HC R&B HEART CARE

## 2024-12-10 PROCEDURE — C1887 CATHETER, GUIDING: HCPCS | Performed by: INTERNAL MEDICINE

## 2024-12-10 PROCEDURE — 343N000001 HC RX 343 MED OP 636: Performed by: INTERNAL MEDICINE

## 2024-12-10 PROCEDURE — 82962 GLUCOSE BLOOD TEST: CPT

## 2024-12-10 PROCEDURE — 93018 CV STRESS TEST I&R ONLY: CPT | Performed by: INTERNAL MEDICINE

## 2024-12-10 PROCEDURE — B2111ZZ FLUOROSCOPY OF MULTIPLE CORONARY ARTERIES USING LOW OSMOLAR CONTRAST: ICD-10-PCS | Performed by: INTERNAL MEDICINE

## 2024-12-10 PROCEDURE — G0378 HOSPITAL OBSERVATION PER HR: HCPCS

## 2024-12-10 PROCEDURE — 99233 SBSQ HOSP IP/OBS HIGH 50: CPT

## 2024-12-10 PROCEDURE — 93454 CORONARY ARTERY ANGIO S&I: CPT | Mod: 26 | Performed by: INTERNAL MEDICINE

## 2024-12-10 PROCEDURE — 272N000001 HC OR GENERAL SUPPLY STERILE: Performed by: INTERNAL MEDICINE

## 2024-12-10 PROCEDURE — 999N000049 HC STATISTIC ECHO STRESS OR NM NPI

## 2024-12-10 PROCEDURE — 99152 MOD SED SAME PHYS/QHP 5/>YRS: CPT | Performed by: INTERNAL MEDICINE

## 2024-12-10 PROCEDURE — 250N000013 HC RX MED GY IP 250 OP 250 PS 637

## 2024-12-10 PROCEDURE — 250N000013 HC RX MED GY IP 250 OP 250 PS 637: Performed by: NURSE PRACTITIONER

## 2024-12-10 PROCEDURE — 78452 HT MUSCLE IMAGE SPECT MULT: CPT | Mod: 26 | Performed by: INTERNAL MEDICINE

## 2024-12-10 PROCEDURE — C1894 INTRO/SHEATH, NON-LASER: HCPCS | Performed by: INTERNAL MEDICINE

## 2024-12-10 PROCEDURE — 250N000011 HC RX IP 250 OP 636: Performed by: INTERNAL MEDICINE

## 2024-12-10 PROCEDURE — 250N000009 HC RX 250: Performed by: INTERNAL MEDICINE

## 2024-12-10 PROCEDURE — 99232 SBSQ HOSP IP/OBS MODERATE 35: CPT | Mod: 25 | Performed by: NURSE PRACTITIONER

## 2024-12-10 PROCEDURE — 250N000011 HC RX IP 250 OP 636

## 2024-12-10 PROCEDURE — C1769 GUIDE WIRE: HCPCS | Performed by: INTERNAL MEDICINE

## 2024-12-10 PROCEDURE — 93017 CV STRESS TEST TRACING ONLY: CPT

## 2024-12-10 PROCEDURE — 93016 CV STRESS TEST SUPVJ ONLY: CPT | Performed by: INTERNAL MEDICINE

## 2024-12-10 PROCEDURE — 93454 CORONARY ARTERY ANGIO S&I: CPT | Performed by: INTERNAL MEDICINE

## 2024-12-10 PROCEDURE — A9500 TC99M SESTAMIBI: HCPCS | Performed by: INTERNAL MEDICINE

## 2024-12-10 RX ORDER — CAFFEINE 200 MG
200 TABLET ORAL
Status: DISCONTINUED | OUTPATIENT
Start: 2024-12-10 | End: 2024-12-10

## 2024-12-10 RX ORDER — ALBUTEROL SULFATE 90 UG/1
2 INHALANT RESPIRATORY (INHALATION) EVERY 5 MIN PRN
Status: DISCONTINUED | OUTPATIENT
Start: 2024-12-10 | End: 2024-12-10

## 2024-12-10 RX ORDER — SODIUM CHLORIDE 9 MG/ML
INJECTION, SOLUTION INTRAVENOUS CONTINUOUS
Status: CANCELLED | OUTPATIENT
Start: 2024-12-10

## 2024-12-10 RX ORDER — IOPAMIDOL 755 MG/ML
INJECTION, SOLUTION INTRAVASCULAR
Status: DISCONTINUED | OUTPATIENT
Start: 2024-12-10 | End: 2024-12-10

## 2024-12-10 RX ORDER — ASPIRIN 81 MG/1
81 TABLET ORAL DAILY
Status: DISCONTINUED | OUTPATIENT
Start: 2024-12-10 | End: 2024-12-11 | Stop reason: HOSPADM

## 2024-12-10 RX ORDER — ASPIRIN 81 MG/1
243 TABLET, CHEWABLE ORAL ONCE
Status: CANCELLED | OUTPATIENT
Start: 2024-12-10

## 2024-12-10 RX ORDER — NALOXONE HYDROCHLORIDE 0.4 MG/ML
0.4 INJECTION, SOLUTION INTRAMUSCULAR; INTRAVENOUS; SUBCUTANEOUS
Status: ACTIVE | OUTPATIENT
Start: 2024-12-10 | End: 2024-12-11

## 2024-12-10 RX ORDER — FENTANYL CITRATE 50 UG/ML
25 INJECTION, SOLUTION INTRAMUSCULAR; INTRAVENOUS
Status: DISCONTINUED | OUTPATIENT
Start: 2024-12-10 | End: 2024-12-11 | Stop reason: HOSPADM

## 2024-12-10 RX ORDER — LIDOCAINE 40 MG/G
CREAM TOPICAL
Status: CANCELLED | OUTPATIENT
Start: 2024-12-10

## 2024-12-10 RX ORDER — SODIUM CHLORIDE 9 MG/ML
INJECTION, SOLUTION INTRAVENOUS CONTINUOUS
Status: DISCONTINUED | OUTPATIENT
Start: 2024-12-10 | End: 2024-12-10

## 2024-12-10 RX ORDER — HEPARIN SODIUM 1000 [USP'U]/ML
INJECTION, SOLUTION INTRAVENOUS; SUBCUTANEOUS
Status: DISCONTINUED | OUTPATIENT
Start: 2024-12-10 | End: 2024-12-10

## 2024-12-10 RX ORDER — REGADENOSON 0.08 MG/ML
0.4 INJECTION, SOLUTION INTRAVENOUS ONCE
Status: DISCONTINUED | OUTPATIENT
Start: 2024-12-10 | End: 2024-12-10

## 2024-12-10 RX ORDER — FENTANYL CITRATE 50 UG/ML
INJECTION, SOLUTION INTRAMUSCULAR; INTRAVENOUS
Status: DISCONTINUED | OUTPATIENT
Start: 2024-12-10 | End: 2024-12-10

## 2024-12-10 RX ORDER — LORAZEPAM 0.5 MG/1
0.5 TABLET ORAL
Status: CANCELLED | OUTPATIENT
Start: 2024-12-10

## 2024-12-10 RX ORDER — FLUMAZENIL 0.1 MG/ML
0.2 INJECTION, SOLUTION INTRAVENOUS
Status: ACTIVE | OUTPATIENT
Start: 2024-12-10 | End: 2024-12-11

## 2024-12-10 RX ORDER — ATROPINE SULFATE 0.1 MG/ML
0.5 INJECTION INTRAVENOUS
Status: ACTIVE | OUTPATIENT
Start: 2024-12-10 | End: 2024-12-11

## 2024-12-10 RX ORDER — OXYCODONE HYDROCHLORIDE 5 MG/1
5 TABLET ORAL EVERY 4 HOURS PRN
Status: DISCONTINUED | OUTPATIENT
Start: 2024-12-10 | End: 2024-12-11 | Stop reason: HOSPADM

## 2024-12-10 RX ORDER — HYDRALAZINE HYDROCHLORIDE 20 MG/ML
10 INJECTION INTRAMUSCULAR; INTRAVENOUS EVERY 4 HOURS PRN
Status: DISCONTINUED | OUTPATIENT
Start: 2024-12-10 | End: 2024-12-11 | Stop reason: HOSPADM

## 2024-12-10 RX ORDER — NALOXONE HYDROCHLORIDE 0.4 MG/ML
0.2 INJECTION, SOLUTION INTRAMUSCULAR; INTRAVENOUS; SUBCUTANEOUS
Status: ACTIVE | OUTPATIENT
Start: 2024-12-10 | End: 2024-12-11

## 2024-12-10 RX ORDER — CAFFEINE CITRATE 20 MG/ML
60 SOLUTION INTRAVENOUS
Status: DISCONTINUED | OUTPATIENT
Start: 2024-12-10 | End: 2024-12-10

## 2024-12-10 RX ORDER — NITROGLYCERIN 0.4 MG/1
0.4 TABLET SUBLINGUAL EVERY 5 MIN PRN
Status: DISCONTINUED | OUTPATIENT
Start: 2024-12-10 | End: 2024-12-10

## 2024-12-10 RX ORDER — LIDOCAINE 40 MG/G
CREAM TOPICAL
Status: DISCONTINUED | OUTPATIENT
Start: 2024-12-10 | End: 2024-12-10

## 2024-12-10 RX ORDER — ASPIRIN 325 MG
325 TABLET ORAL ONCE
Status: CANCELLED | OUTPATIENT
Start: 2024-12-10 | End: 2024-12-10

## 2024-12-10 RX ORDER — AMINOPHYLLINE 25 MG/ML
50-100 INJECTION, SOLUTION INTRAVENOUS
Status: DISCONTINUED | OUTPATIENT
Start: 2024-12-10 | End: 2024-12-10

## 2024-12-10 RX ORDER — VERAPAMIL HYDROCHLORIDE 2.5 MG/ML
INJECTION, SOLUTION INTRAVENOUS
Status: DISCONTINUED | OUTPATIENT
Start: 2024-12-10 | End: 2024-12-10

## 2024-12-10 RX ORDER — LORAZEPAM 2 MG/ML
0.5 INJECTION INTRAMUSCULAR
Status: CANCELLED | OUTPATIENT
Start: 2024-12-10

## 2024-12-10 RX ORDER — POTASSIUM CHLORIDE 1500 MG/1
20 TABLET, EXTENDED RELEASE ORAL
Status: CANCELLED | OUTPATIENT
Start: 2024-12-10

## 2024-12-10 RX ORDER — ACETAMINOPHEN 325 MG/1
650 TABLET ORAL EVERY 4 HOURS PRN
Status: DISCONTINUED | OUTPATIENT
Start: 2024-12-10 | End: 2024-12-11 | Stop reason: HOSPADM

## 2024-12-10 RX ORDER — OXYCODONE HYDROCHLORIDE 5 MG/1
10 TABLET ORAL EVERY 4 HOURS PRN
Status: DISCONTINUED | OUTPATIENT
Start: 2024-12-10 | End: 2024-12-11 | Stop reason: HOSPADM

## 2024-12-10 RX ORDER — NITROGLYCERIN 5 MG/ML
VIAL (ML) INTRAVENOUS
Status: DISCONTINUED | OUTPATIENT
Start: 2024-12-10 | End: 2024-12-10

## 2024-12-10 RX ORDER — ATORVASTATIN CALCIUM 40 MG/1
40 TABLET, FILM COATED ORAL EVERY EVENING
Status: DISCONTINUED | OUTPATIENT
Start: 2024-12-10 | End: 2024-12-11 | Stop reason: HOSPADM

## 2024-12-10 RX ADMIN — Medication 35.4 MILLICURIE: at 09:04

## 2024-12-10 RX ADMIN — Medication 14.2 MILLICURIE: at 07:00

## 2024-12-10 RX ADMIN — LISINOPRIL AND HYDROCHLOROTHIAZIDE 1 TABLET: 12.5; 1 TABLET ORAL at 11:21

## 2024-12-10 RX ADMIN — ATORVASTATIN CALCIUM 40 MG: 40 TABLET, FILM COATED ORAL at 20:34

## 2024-12-10 RX ADMIN — ACETAMINOPHEN 975 MG: 325 TABLET, FILM COATED ORAL at 20:36

## 2024-12-10 RX ADMIN — ASPIRIN 81 MG: 81 TABLET, COATED ORAL at 20:41

## 2024-12-10 RX ADMIN — AMLODIPINE BESYLATE 10 MG: 10 TABLET ORAL at 11:21

## 2024-12-10 RX ADMIN — REGADENOSON 0.4 MG: 0.08 INJECTION, SOLUTION INTRAVENOUS at 09:06

## 2024-12-10 RX ADMIN — HYDRALAZINE HYDROCHLORIDE 25 MG: 25 TABLET ORAL at 00:09

## 2024-12-10 RX ADMIN — HYDRALAZINE HYDROCHLORIDE 10 MG: 20 INJECTION INTRAMUSCULAR; INTRAVENOUS at 23:57

## 2024-12-10 ASSESSMENT — ACTIVITIES OF DAILY LIVING (ADL)
ADLS_ACUITY_SCORE: 42
ADLS_ACUITY_SCORE: 42
ADLS_ACUITY_SCORE: 33
ADLS_ACUITY_SCORE: 33
ADLS_ACUITY_SCORE: 42
ADLS_ACUITY_SCORE: 33
ADLS_ACUITY_SCORE: 42
ADLS_ACUITY_SCORE: 42
ADLS_ACUITY_SCORE: 33
ADLS_ACUITY_SCORE: 33
ADLS_ACUITY_SCORE: 42
ADLS_ACUITY_SCORE: 33
ADLS_ACUITY_SCORE: 33
ADLS_ACUITY_SCORE: 42
ADLS_ACUITY_SCORE: 33
ADLS_ACUITY_SCORE: 42
ADLS_ACUITY_SCORE: 33
ADLS_ACUITY_SCORE: 42

## 2024-12-10 NOTE — PROGRESS NOTES
Care Suites Procedure Nursing Note    Patient Information  Name: Edna Horton  Age: 49 year old    Procedure  Procedure: Michelle Stress Test  Procedure start time: 9:05 am  Procedure complete time: 9:12 am  Pt tolerated fairly well. VSS. Pt denied chest pain or pressure prior to injection. After injection denied chest pain or pressure.  Complained of nausea.      Pt transferred back to Rm [id number] per w/c.

## 2024-12-10 NOTE — PROVIDER NOTIFICATION
MD Notification    Notified Person: MD    Notified Person Name: kassie ArreolaCOREEN     Notification Date/Time:11:29 AM 12/10/24    Notification Interaction:reed    Purpose of Notification: Pt is very adamant, wants to eat right now, states she is starving. Explained to pt that we need to wait for the results of test; she states understanding but does not agree.      Orders Received:    Comments:

## 2024-12-10 NOTE — PROGRESS NOTES
[facility]    Medicine Progress Note - Hospitalist Service    Date of Admission:  12/8/2024    Assessment & Plan   Edna Horton is a 49 year old female started to observation on 12/8/2024 for right-sided chest pain and uncontrolled hypertension.  AMH is significant for hypertension with inconsistent treatment adherence, obesity, presented to emergency department for evaluation of right-sided chest pain, admitted for management of hypertensive urgency and the risks stratification for chest pain.     Chest pain, mostly right sided  Concern for pulmonary hypertension by CT angiogram  Hypercholesterolemia, new diagnosis with abnormal lipid panel  *Had right-sided chest pain/pressure sensation the morning of 12/7/2024, this happened while she walked to use bathroom.  It lasted for couple of hours, per history positional.   *Patient ruled out for PE by CT angiogram chest 12/8/2024  * Flat troponin, 25>> 28>>26, no acute ST segment change on EKG, no coronary calcification on CT scan but also showed mild pulmonary artery enlargement, concerning for pulmonary hypertension.   - TTE 12/09/24 as noted below  - Cardiology consulted personally discussed management with the attending cardiology physician who is recommending stress test on 12/10/2024.  - As of 5:26 PM 12/09/24 patient is unsure if she will stay for that test for not.  If she decides to leave before completing her stress test on 12/10/2024 it would be AGAINST MEDICAL ADVICE.  In the event that occurs I have prescribed her antihypertensive, lipid-lowering agent that can be provided to her when she leaves.  I have counseled patient about potential premature death and other adverse outcomes should she decide to leave AMA  -  lipid panel abnormal, starting high intensity atorvastatin 40 mg daily  - TSH WNL  - Lexiscan 12/10 abnormal showing small area of infarction in the distal anterior and apical segments of th LV and small area of  a mild degree of nontransmural infarction the inferolateral segments of the LV. EF 54%.   - Cardiology consulted     Hypertensive urgency  Elevated creatinine now resolved  *PTA lisinopril/HCTZ [10-12.5], takes medication inconsistently, does not have a PCP  -Needs to obtain blood pressures cuff for home measurements at home   -counseled regarding importance of establish PCP.    - amlodipine 10 mg, po, daily new this admission  - PRN p.o. hydralazine for SBP >170  - TTE as above  - Further workup in the outpatient setting  - Counseled regarding importance of tobacco sensation, lifestyle changes including but not limited dietary changes, reduction of sodium intake including reduction of fast food intake, increased exercise  - Nutrition consult for healthier tasty food choices     Tobacco use, active  Marijuana use  -Counseled patient at length regarding importance of smoking cessation as this increases her risk for cardiovascular and neurovascular disease  -Patient reports she is ready to quit smoking     Anemia: based on hgb <11, baseline appears to be 10-11   Suspect gynecologic/menstrual blood loss with reports of menorrhagia  * Iron panel and ferritin WNL  -OP referral to gynecology for menorrhagia management  -Will also need referral for age-related cancer screening for colonoscopy at OP follow up as part of anemia evaluation     T2DM 6.8% A1C 12/8/24  Newly diagnosed this admission  -Not on meds PTA  -POC gluc tid ac and HS  -As above discussed role of exercise, dietary changes, smoking cessation and weight loss all will help optimize her diabetic control  -Defer initiation of med management to PCP    Recent Labs   Lab 12/10/24  1218 12/10/24  0743 12/09/24  2200 12/09/24  1721 12/09/24  0639 12/08/24  1425   * 196* 210* 159* 183* 230*        Observation Goals: -diagnostic tests and consults completed and resulted, -vital signs normal or at patient baseline, Nurse to notify provider when observation  goals have been met and patient is ready for discharge.  Diet: Diet  NPO for Medical/Clinical Reasons Except for: Meds    DVT Prophylaxis: Pneumatic Compression Devices and Ambulate every shift  Hill Catheter: Not present  Lines: None     Cardiac Monitoring: None  Code Status: Full Code      Clinically Significant Risk Factors Present on Admission                   # Hypertension: Noted on problem list          # DMII: A1C = 6.8 % (Ref range: <5.7 %) within past 6 months               Social Drivers of Health    Food Insecurity: Unknown (12/8/2024)    Food Insecurity     Within the past 12 months, did you worry that your food would run out before you got money to buy more?: Patient declined     Within the past 12 months, did the food you bought just not last and you didn t have money to get more?: Patient declined   Housing Stability: Unknown (12/8/2024)    Housing Stability     Do you have housing? : Patient declined     Are you worried about losing your housing?: Patient declined   Tobacco Use: High Risk (8/27/2024)    Patient History     Smoking Tobacco Use: Every Day     Smokeless Tobacco Use: Unknown   Financial Resource Strain: Unknown (12/8/2024)    Financial Resource Strain     Within the past 12 months, have you or your family members you live with been unable to get utilities (heat, electricity) when it was really needed?: Patient declined   Transportation Needs: Unknown (12/8/2024)    Transportation Needs     Within the past 12 months, has lack of transportation kept you from medical appointments, getting your medicines, non-medical meetings or appointments, work, or from getting things that you need?: Patient declined          Disposition Plan     Medically Ready for Discharge: Anticipated Tomorrow pending cardiology recommendations.       The patient's care was discussed with the Attending Physician, Dr. Santizo, Bedside Nurse, and Patient.    Juan C Arreola NP  Hospitalist Service  [facility]  [facility]  Securely message with [employer] (more info)  Text page via [employer] Paging/Directory   ______________________________________________________________________    Interval History   Patient has told nursing staff several times that she wants to leave AMA. Patient was educated on importance of having Cardiology evaluate her for her abnormal stress test. Patient denies any further chest pain, SOB, WILLIS, orthopnea, palpitations, or lightheadedness.     Physical Exam   Vital Signs: Temp: 97.4  F (36.3  C) Temp src: Oral BP: (!) 169/93 Pulse: 60   Resp: 16 SpO2: 93 % O2 Device: None (Room air)    Weight: 264 lbs 6.4 oz  General:  Appears stated age, no acute distress. A&O x 3.  Skin:  Warm, dry. No rashes or lesions on exposed skin.  HEENT:  Normocephalic, atraumatic.  Neck:  Supple.  Chest:  Breath sounds CTA and no increased work of breathing on room air.  Cardiovascular:  RRR, no rub or murmur.   Abdomen:  Soft, non-tender, non-distended.  Musculoskeletal:  Moves all four extremities. No muscle atrophy.  Neurological:  No focal neurological deficits.   Psychiatric:  Flat Affect.    Medical Decision Making       60 MINUTES SPENT BY ME on the date of service doing chart review, history, exam, documentation & further activities per the note.      Data         Imaging results reviewed over the past 24 hrs:   Recent Results (from the past 24 hours)   Echocardiogram Complete   Result Value    LVEF  55%    Narrative    [id number]  [id number]  [id number]  [id number]^ANA ROSA^NAZARIO     [facility]  Echocardiography Laboratory  [address] [place] [address]     Name: MARITZA NEELY  MRN: [id number]  : 1975  Study Date: 2024 02:53 PM  Age: 49 yrs  Gender: Female  Patient Location: [facility]  Reason For Study: Pulmonary Hypertension  Ordering Physician: NAZARIO OSEGUERA  Referring Physician: None  Performed By: Kiatlynn Turner RDCS     BSA: 2.3 m2  Height: 66  in  Weight: 264 lb  HR: 81  BP: 175/107 mmHg  ______________________________________________________________________________  Procedure  Complete Portable Echo Adult. Definity (NDC #21797-061) given intravenously.  ______________________________________________________________________________  Interpretation Summary     1. The left ventricle is normal in size. The visual ejection fraction is  estimated at 55%.  2. There is mild to moderate concentric left ventricular hypertrophy.  3. The right ventricle is normal in structure, function and size.  4. No tricuspid regurgitation. Right ventricular systolic pressure could not  be approximated due to inadequate tricuspid regurgitation.     No previous echo for comparison.  ______________________________________________________________________________  Left Ventricle  The left ventricle is normal in size. There is mild to moderate concentric  left ventricular hypertrophy. The visual ejection fraction is estimated at  55%. Left ventricular diastolic function is normal. Normal left ventricular  wall motion.     Right Ventricle  The right ventricle is normal in structure, function and size.     Atria  Normal left atrial size. Right atrial size is normal. There is no atrial shunt  seen.     Mitral Valve  The mitral valve is normal in structure and function.     Tricuspid Valve  No tricuspid regurgitation. Right ventricular systolic pressure could not be  approximated due to inadequate tricuspid regurgitation.     Aortic Valve  There is trace aortic regurgitation.     Pulmonic Valve  The pulmonic valve is normal in structure and function.     Vessels  Normal ascending, transverse (arch), and descending aorta. The inferior vena  cava was normal in size with preserved respiratory variability.     Pericardium  There is no pericardial effusion.     Rhythm  Sinus rhythm was noted.  ______________________________________________________________________________  MMode/2D Measurements  & Calculations  IVSd: 1.5 cm     LVIDd: 5.1 cm  LVIDs: 3.4 cm  LVPWd: 1.5 cm  FS: 32.9 %  LV mass(C)d: 337.5 grams  LV mass(C)dI: 150.0 grams/m2  Ao root diam: 3.7 cm  LA dimension: 4.2 cm  asc Aorta Diam: 3.7 cm  LA/Ao: 1.1  Ao root diam index Ht(cm/m): 2.2  Ao root diam index BSA (cm/m2): 1.6  Asc Ao diam index BSA (cm/m2): 1.6  Asc Ao diam index Ht(cm/m): 2.2  LA Volume (BP): 93.2 ml     LA Volume Index (BP): 41.4 ml/m2  RWT: 0.59  TAPSE: 2.2 cm     Doppler Measurements & Calculations  MV E max earl: 91.2 cm/sec  MV A max earl: 54.5 cm/sec  MV E/A: 1.7  MV dec time: 0.18 sec  PA acc time: 0.16 sec  E/E' av.4  Lateral E/e': 16.3  Medial E/e': 16.5  RV S Earl: 13.7 cm/sec     ______________________________________________________________________________  Report approved by: Baldemar Hoyos MD on 2024 03:49 PM         NM Lexiscan stress test (nuc card)   Result Value    Target     Baseline Systolic     Baseline Diastolic BP 96    Last Stress Systolic     Last Stress Diastolic BP 82    Baseline HR 78    Max HR  96    Max Predicted HR  56    Rate Pressure Product 14,592.0    Left Ventricular EF 54    Narrative      The nuclear stress test is abnormal.    There is a small area of infarction in the distal anterior and apical   segment(s) of the left ventricle associated with will-infarct ischemia.   Presence of significant breat attenuation and apical thinning decreases   accuracy.    There is a small area of a mild degree of nontransmural infarction in   the inferolateral segment(s) of the left ventricle. Soft tissue   attenuation decreases accuracy.    Left ventricular function is low normal.    The left ventricular ejection fraction at stress is 54%. Mild apcial   hypokinesis.    There is no prior study for comparison.    Suggest CT coronary angiography if clincially approrpaite.

## 2024-12-10 NOTE — PROGRESS NOTES
Observation goals  PRIOR TO DISCHARGE        Comments:   -diagnostic tests and consults completed and resulted- not met   -vital signs normal or at patient baseline- partially met   Nurse to notify provider when observation goals have been met and patient is ready for discharge.

## 2024-12-10 NOTE — PROGRESS NOTES
Patient still contemplating leaving AMA, paperwork is printed and in the front of the chart. There are discharge medications for BP in the drawer if she decides to leave that she should take. Explained to patient that she should stay for the lexiscan. She said she will consider.

## 2024-12-10 NOTE — PROVIDER NOTIFICATION
MD Notification    Notified Person: MD    Notified Person Name:Jose Juan JoaquinCOREEN     Notification Date/Time:2:18 PM 12/10/24     Notification Interaction:reed    Purpose of Notification:FYI, pt is refusing HCG blood draw, does not want to be poked.     Orders Received:    Comments:

## 2024-12-10 NOTE — PLAN OF CARE
PRIMARY Concern: HTN, CP  SAFETY RISK Concerns (fall risk, behaviors, etc.): NA      Aggression Tool Color: Green  Isolation/Type: NA  Tests/Procedures for NEXT shift: Lexiscan    Consults? (Pending/following, signed-off?) Cardiology, CM/SW s/o, nutrition consulted   Where is patient from? (Home, TCU, etc.): Home  Other Important info for NEXT shift: Left AMA from ED on 12/8.   EF 55%, mild to moderate left ventricular hypertrophy.  Gave PRN hydralazine this afternoon for high BP.  **If patient decides to leave AMA, she has new BP meds in the discharge drawer to be discharged with.**  Anticipated DC date & active delays: TBD  _____________________________________________________________________________  SUMMARY NOTE:  Orientation/Cognitive: A/Ox4  Observation Goals (Met/ Not Met): Not met   Mobility Level/Assist Equipment: Ind  Antibiotics & Plan (IV/po, length of tx left): NA  Pain Management: brenna Cline tylenol   Tele/VS/O2: VSS RA, ex HTN. Hydralazine available for SBP >170, Tele- NSR  ABNL Lab/BG: Trop 25>27, Hgb 11.1  Diet: Cardiac, no caff  Bowel/Bladder: Cont  Skin Concerns: Intact, refused skin assessment   Drains/Devices: PIV SL  Patient Stated Goal for Today: Rest       Observation goals  PRIOR TO DISCHARGE        Comments:   -diagnostic tests and consults completed and resulted- not met   -vital signs normal or at patient baseline- partially met   Nurse to notify provider when observation goals have been met and patient is ready for discharge.

## 2024-12-10 NOTE — PLAN OF CARE
PRIMARY Concern: HTN, CP  SAFETY RISK Concerns (fall risk, behaviors, etc.): NA      Aggression Tool Color: Green  Isolation/Type: NA  Tests/Procedures for NEXT shift: Lexiscan    Consults? (Pending/following, signed-off?) Cards following, CM/SW s/o, nutrition consulted   Where is patient from? (Home, TCU, etc.): Home  Other Important info for NEXT shift: Left AMA from ED on 12/8. Refused overnight VS and 0200 BG check  **If patient decides to leave AMA, she has new BP meds in the discharge drawer to be discharged with.**  Anticipated DC date & active delays: TBD  ____________________________________________________________________  SUMMARY NOTE:  Orientation/Cognitive: A/Ox4  Observation Goals (Met/ Not Met): Not met   Mobility Level/Assist Equipment: Ind  Antibiotics & Plan (IV/po, length of tx left): NA  Pain Management: Denies, schd tylenol   Tele/VS/O2: VSS RA, ex HTN. PO hydralazine x1 and IV hydralazine x1  ABNL Lab/BG: Trop 25>27, Hgb 11.1  Diet: NPO  Bowel/Bladder: Cont  Skin Concerns: Intact, refused skin assessment   Drains/Devices: PIV SL  Patient Stated Goal for Today: Rest     Observation goals  PRIOR TO DISCHARGE        Comments:   -diagnostic tests and consults completed and resulted not met, Lexiscan 12/10 pending  -vital signs normal or at patient baseline partially met, required 1x dose of IV Hydralazine for HTN  Nurse to notify provider when observation goals have been met and patient is ready for discharge.

## 2024-12-10 NOTE — PROGRESS NOTES
Observation goals  PRIOR TO DISCHARGE        Comments:   -diagnostic tests and consults completed and resulted not met, Lexiscan 12/10 pending  -vital signs normal or at patient baseline partially met, required 1x dose of IV Hydralazine for HTN  Nurse to notify provider when observation goals have been met and patient is ready for discharge.

## 2024-12-10 NOTE — PROGRESS NOTES
"Pt refused 0200 blood sugar check, states \"I don't want to be poked no more, don't poke me tonight\"  "

## 2024-12-10 NOTE — PROGRESS NOTES
[facility]    Cardiology Progress Note    Date of Admission: 12/8/2024  Service Date: 12/10/2024     Summary:  Edna Horton is a very pleasant 49 year old female with a past medical history notable for hypertension with inconsistent treatment adherence, tobacco use, marijuana use, chronic anemia, and obesity, who presented to emergency department  on 12/8/24 for evaluation of right-sided chest pain. She was admitted for management of hypertensive urgency. Cardiology was consulted for further evaluation given her symptoms of atypical chest pain.     Interval History   Patient is resting comfortably. She denies recurrent symptoms of chest pain overnight or this morning. She has not had shortness of breath, palpitations, or lower extremity edema.     Assessment:  1. Chest pain, atypical and primarily right-sided  - Troponin levels minimally elevated and flat at 26-->28-->25-->27.  - EKG shows sinus rhythm and appears suggestive of LVH with strain pattern and left atrial enlargement.  - Echocardiogram 12/9/24 showing LVEF 55%, mild to moderate concentric LVH, normal RV and no significant valve disease.  - Lexiscan nuclear stress test today showing a small area of infarction in the distal anterior and apical segments of the left ventricle associated with will-infarct ischemia. Presence of significant breat attenuation and apical thinning decreases accuracy. A small area of a mild degree of nontransmural infarction is also noted in the inferolateral segments of the left ventricle. Soft tissue attenuation decreases accuracy. EF at stress reported at 54% with mild apcial hypokinesis.    2. Hypertensive urgency  3. New diagnosis of type 2 diabetes mellitus with A1c 6.8% here.  4. Obesity  5. Ongoing smoking of around 2-4 cigarettes per day.  6. Chronic anemia with baseline appearing to be around 10-11 and stable here. Reports of menorrhagia otherwise no clear bleeding issues.     Plan:    1. Discussed case with Dr. Samano. Discussed options for further evaluation and management of the stress test results with the patient, including coronary angiography and possible PCI versus consideration for coronary CTA. However, if CTA were to show moderate to severe disease then ultimately may warrant angiogram given the stress test findings and her risk factor profile. Risks and benefits of coronary angiogram discussed today including, bleeding, bruising, infection, allergic reaction, kidney damage (including need for dialysis), stroke, heart attack, vascular damage, emergency open heart surgery, up to and including death. Patient indicates understanding and is agreeable to proceed. She has no known history of an allergy to contrast.   2. Start aspirin 81 mg daily and atorvastatin 40 mg at bedtime.  3. Continue with blood pressure control with hydralazine, amlodipine, and lisinopril-hydrochlorothiazide. If blood pressure remains sub-optimally controlled despite titration of multidrug regimen then would consider evaluation for secondary HTN.  4. Counseled regarding smoking cessation.  5. We will follow along. Further recommendations pending the angiogram findings.     Thank you for the opportunity to participate in this pleasant patient's care.     Please kindly note that this document was completed in part using voice recognition software. Although reviewed after completion, some word substitutions and typographical errors may occur. Please contact me if clarification is needed.     EDNA Cancino, CNP   Nurse Practitioner  [facility]  Pager: [phone]  Securely message via [employer] (8am - 5pm, M-F)    Patient Active Problem List   Diagnosis    Hypertensive urgency    Chest pain, unspecified type     Physical Exam   Temp: 97.4  F (36.3  C) Temp src: Oral BP: (!) 169/93 Pulse: 60   Resp: 16 SpO2: 93 % O2 Device: None (Room air)    Vitals:    12/08/24 1931   Weight: 119.9 kg (264 lb 6.4  oz)     Vital Signs with Ranges  Temp:  [97.4  F (36.3  C)-98.8  F (37.1  C)] 97.4  F (36.3  C)  Pulse:  [] 60  Resp:  [16-18] 16  BP: (152-176)/(82-97) 169/93  SpO2:  [91 %-97 %] 93 %  No intake/output data recorded.    General: Appears her stated age, well nourished, and in no acute distress.  Eyes: No scleral icterus.  HEENT: Neck supple.  Unable to visualize JVP due to body habitus.  Cardiovascular: Regular rate and rhythm, normal S1 and S2. No murmur, rub, or gallop.  Extremities: Moves all extremities well and symmetrically. There is no pitting lower extremity edema bilaterally.  Respiratory: Breathing non-labored. Lungs clear to auscultation with no crackles or wheezes bilaterally.  Skin: No pallor or cyanosis.  Gastrointestinal: Non-distended.  Psych: Appropriate affect. Mentation normal.  Neurological: No gross focal deficits.    Medications   Current Facility-Administered Medications   Medication Dose Route Frequency Provider Last Rate Last Admin     Current Facility-Administered Medications   Medication Dose Route Frequency Provider Last Rate Last Admin    acetaminophen (TYLENOL) tablet 975 mg  975 mg Oral TID Johnnie Barnhart MD   975 mg at 12/10/24 1121    amLODIPine (NORVASC) tablet 10 mg  10 mg Oral Daily Leigh Funez APRN CNP   10 mg at 12/10/24 1121    lisinopril-hydrochlorothiazide (ZESTORETIC) 10-12.5 MG per tablet 1 tablet  1 tablet Oral Daily Johnnie Barnhart MD   1 tablet at 12/10/24 1121    sodium chloride (PF) 0.9% PF flush 10 mL  10 mL Intravenous Once Froilan Samano MD        sodium chloride (PF) 0.9% PF flush 3 mL  3 mL Intravenous Q8H Baldemar Hooys MD         Data   Recent Labs   Lab 12/10/24  1218 12/10/24  0743 12/09/24  2200 12/09/24  1721 12/09/24  0639 12/08/24  1425 12/07/24  1721   NA  --   --   --   --  135 140 140   POTASSIUM  --   --   --   --  3.7 3.6 3.8   CHLORIDE  --   --   --   --  99 102 104   CO2  --   --   --   --  26 26 25    ANIONGAP  --   --   --   --  10 12 11   * 196* 210*   < > 183* 230* 143*   BUN  --   --   --   --  11.6 12.1 9.9   CR  --   --   --   --  0.89 1.12* 0.89   GFRESTIMATED  --   --   --   --  80 60* 80   ANSON  --   --   --   --  9.2 9.2 9.1   PROTTOTAL  --   --   --   --  7.3  --  7.5   ALBUMIN  --   --   --   --  3.7  --  3.7   BILITOTAL  --   --   --   --  0.9  --  0.7   ALKPHOS  --   --   --   --  89  --  92   AST  --   --   --   --  17  --  21   ALT  --   --   --   --  10  --  11    < > = values in this interval not displayed.     Recent Labs   Lab 12/08/24  1425   CHOL 178   HDL 42*   *   TRIG 106

## 2024-12-11 VITALS
WEIGHT: 256.6 LBS | BODY MASS INDEX: 41.42 KG/M2 | RESPIRATION RATE: 18 BRPM | TEMPERATURE: 97.5 F | OXYGEN SATURATION: 98 % | SYSTOLIC BLOOD PRESSURE: 173 MMHG | HEART RATE: 88 BPM | DIASTOLIC BLOOD PRESSURE: 95 MMHG

## 2024-12-11 PROCEDURE — 250N000013 HC RX MED GY IP 250 OP 250 PS 637

## 2024-12-11 PROCEDURE — 250N000013 HC RX MED GY IP 250 OP 250 PS 637: Performed by: NURSE PRACTITIONER

## 2024-12-11 PROCEDURE — 99239 HOSP IP/OBS DSCHRG MGMT >30: CPT

## 2024-12-11 RX ORDER — LISINOPRIL AND HYDROCHLOROTHIAZIDE 20; 25 MG/1; MG/1
1 TABLET ORAL DAILY
Qty: 14 TABLET | Refills: 0 | Status: SHIPPED | OUTPATIENT
Start: 2024-12-11

## 2024-12-11 RX ORDER — ASPIRIN 81 MG/1
81 TABLET ORAL DAILY
Qty: 14 TABLET | Refills: 0 | Status: SHIPPED | OUTPATIENT
Start: 2024-12-12

## 2024-12-11 RX ORDER — LISINOPRIL AND HYDROCHLOROTHIAZIDE 20; 25 MG/1; MG/1
1 TABLET ORAL DAILY
Status: DISCONTINUED | OUTPATIENT
Start: 2024-12-11 | End: 2024-12-11 | Stop reason: HOSPADM

## 2024-12-11 RX ADMIN — ACETAMINOPHEN 975 MG: 325 TABLET, FILM COATED ORAL at 09:15

## 2024-12-11 RX ADMIN — AMLODIPINE BESYLATE 10 MG: 10 TABLET ORAL at 09:11

## 2024-12-11 RX ADMIN — LISINOPRIL AND HYDROCHLOROTHIAZIDE 1 TABLET: 25; 20 TABLET ORAL at 12:39

## 2024-12-11 RX ADMIN — ASPIRIN 81 MG: 81 TABLET, COATED ORAL at 09:11

## 2024-12-11 ASSESSMENT — ACTIVITIES OF DAILY LIVING (ADL)
DRESSING/BATHING_DIFFICULTY: NO
ADLS_ACUITY_SCORE: 42
CHANGE_IN_FUNCTIONAL_STATUS_SINCE_ONSET_OF_CURRENT_ILLNESS/INJURY: NO
ADLS_ACUITY_SCORE: 42
WALKING_OR_CLIMBING_STAIRS_DIFFICULTY: NO
ADLS_ACUITY_SCORE: 42
ADLS_ACUITY_SCORE: 24
TOILETING_ISSUES: NO
ADLS_ACUITY_SCORE: 42
FALL_HISTORY_WITHIN_LAST_SIX_MONTHS: NO
WEAR_GLASSES_OR_BLIND: NO
ADLS_ACUITY_SCORE: 24
ADLS_ACUITY_SCORE: 42
ADLS_ACUITY_SCORE: 24
DOING_ERRANDS_INDEPENDENTLY_DIFFICULTY: NO
ADLS_ACUITY_SCORE: 42
DIFFICULTY_EATING/SWALLOWING: NO
ADLS_ACUITY_SCORE: 42
ADLS_ACUITY_SCORE: 24

## 2024-12-11 NOTE — PLAN OF CARE
Transfer from short stay.  Pt aox4, ind, RA and full code. Tele SR. Pt denies CP and SOB. Abnormal ruth today. Angio completed with no intervention. Rt radial WDL with CMS intact. PRN hydralazine given for SBP> 180  Plan: Possible discharge.

## 2024-12-11 NOTE — PROGRESS NOTES
Patient discharged home  At 1742 accompanied by CNA   Written instructions sent with patient -yes and patient/family verbalized understanding.   Belongings sent with the patient -yes  Home medications sent with patient n/a  Prescriptions for 4 medications filled and sent with pt.

## 2024-12-11 NOTE — PROGRESS NOTES
Heart cath normal  Cardio will sign off  HTN management per IM  Thank you  Pt not seen today  Please call if still need cardio input

## 2024-12-11 NOTE — DISCHARGE SUMMARY
[facility]  Hospitalist Discharge Summary      Date of Admission:  12/8/2024  Date of Discharge:  12/11/2024  Discharging Provider: Juan C Arreola NP  Discharge Service: Hospitalist Service    Discharge Diagnoses   Hypertensive Urgency  Resistant hypertension  Diabetes Mellitus Type 2   Menorrhagia  Anemia  Tobacco use  Elevated creatinine  Hyperchloresterolemia     Clinically Significant Risk Factors   -  # DMII: A1C = 6.8 % (Ref range: <5.7 %) within past 6 months       Follow-ups Needed After Discharge   Follow-up Appointments       Follow-up and recommended labs and tests       Follow up with primary care provider (you need to establish care with a primary provider), Physician No Ref-Primary, within 7 days to evaluate medication change, for hospital follow- up, and regarding new diagnosis.  The following labs/tests are recommended: BMP, CBC.  Also consider referral for gynecology for menorrhagia management and colonoscopy for age-related cancer screening for which she is overdue but can also be evaluated as part of her anemia workup                Discharge Disposition   Discharged to home  Condition at discharge: [facility] Course   Edna Horton is a 49 year old female started to observation on 12/8/2024 for right-sided chest pain and uncontrolled hypertension.  AMH is significant for hypertension with inconsistent treatment adherence, obesity, presented to emergency department for evaluation of right-sided chest pain, admitted for management of hypertensive urgency and the risks stratification for chest pain.     Chest pain, mostly right sided  Concern for pulmonary hypertension by CT angiogram  Hypercholesterolemia, new diagnosis with abnormal lipid panel  *Had right-sided chest pain/pressure sensation the morning of 12/7/2024, this happened while she walked to use bathroom.  It lasted for couple of hours, per history positional.   *Patient ruled out for PE by CT  angiogram chest 12/8/2024  * Flat troponin, 25>> 28>>26, no acute ST segment change on EKG, no coronary calcification on CT scan but also showed mild pulmonary artery enlargement, concerning for pulmonary hypertension.   - TTE 12/09/24 as noted below  -  lipid panel abnormal, starting high intensity atorvastatin 40 mg daily  - TSH WNL  - Lexiscan 12/10 abnormal showing small area of infarction in the distal anterior and apical segments of th LV and small area of a mild degree of nontransmural infarction the inferolateral segments of the LV. EF 54%.   - Cardiology consulted; coronary angiogram shows 0% coronary artery disease; chest pain likely due to hypertensive urgency  - SW/CC consulted for discussion of medical assistance process as patient started MA process, has questions. Care coordinator is scheduling an appointment for patient to establish care with PCP within 1 week     Hypertensive urgency  Elevated creatinine now resolved  *PTA lisinopril/HCTZ [10-12.5], takes medication inconsistently, does not have a PCP  -Needs to obtain blood pressures cuff for home measurements at home   -counseled regarding importance of establish PCP.    - amlodipine 10 mg, po, daily new this admission  - PRN p.o. hydralazine for SBP >170  - TTE as above  - Further workup in the outpatient setting  - Counseled regarding importance of tobacco sensation, lifestyle changes including but not limited dietary changes, reduction of sodium intake including reduction of fast food intake, increased exercise  - Nutrition consult for healthier tasty food choices     Tobacco use, active  Marijuana use  -Counseled patient at length regarding importance of smoking cessation as this increases her risk for cardiovascular and neurovascular disease  -Patient reports she is ready to quit smoking;      Anemia: based on hgb <11, baseline appears to be 10-11   Suspect gynecologic/menstrual blood loss with reports of menorrhagia  * Iron panel and  ferritin WNL  -OP referral to gynecology for menorrhagia management  -Recommend patient has age-related cancer screening for colonoscopy at OP follow up as part of anemia evaluation; patient has PCP appointment, can be discussed with PCP     T2DM 6.8% A1C 12/8/24  Newly diagnosed this admission  -Not on meds PTA  -POC gluc tid ac and HS  -As above discussed role of exercise, dietary changes, smoking cessation and weight loss all will help optimize her diabetic control  -Defer initiation of med management to PCP    Recent Labs   Lab 12/10/24  1218 12/10/24  0743 12/09/24  2200 12/09/24  1721 12/09/24  0639 12/08/24  1425   * 196* 210* 159* 183* 230*       Consultations This Hospital Stay   CARDIOLOGY IP CONSULT  NUTRITION SERVICES ADULT IP CONSULT  CARE MANAGEMENT / SOCIAL WORK IP CONSULT  CARDIOLOGY IP CONSULT  PHARMACY IP CONSULT  SMOKING CESSATION PROGRAM IP CONSULT    Code Status   Full Code    Time Spent on this Encounter   I, Juan C Arreola NP, personally saw the patient today and spent greater than 30 minutes discharging this patient.       Juan C Arreola NP  [facility]  [address] [place] [address], [address]  [facility] [address]  Phone: [phone]  ______________________________________________________________________    Physical Exam   Vital Signs: Temp: 97.5  F (36.4  C) Temp src: Oral BP: (!) 173/95 Pulse: 88   Resp: 18 SpO2: 98 % O2 Device: None (Room air) Oxygen Delivery: 2 LPM  Weight: 256 lbs 9.6 oz  General:  Appears stated age, no acute distress. A&O x 3.  Skin:  Warm, dry. No rashes or lesions on exposed skin.  HEENT:  Normocephalic, atraumatic; EOMs grossly intact.  Neck:  Supple.  Chest:  Breath sounds CTA and no increased work of breathing on room air.  Cardiovascular:  RRR, no rub or murmur. No peripheral edema.  Abdomen:  Soft, non-tender, obese.  Musculoskeletal:  Moves all four extremities. No muscle atrophy.  Neurological:  No focal neurological  deficits.  Psychiatric:  Affect and mood congruent.         Primary Care Physician   Physician No Ref-Primary    Discharge Orders      Ob/Gyn  Referral      Activity    Your activity upon discharge: activity as tolerated     Monitor and record    Blood pressure: daily  -Obtain a blood pressure cuff you can get this at a local pharmacy such as [facility] also places such as [employer], [employer] or even [employer]  -Check your blood pressure at home using the following instructions:  upon waking in the morning, go to the bathroom, sit with feet on floor, arm supported, no eating, drink or talking and rest for 3-5 minutes.  Then take your blood pressure.  Keep a log of the blood pressure readings and take it with you to your next primary care appointment.     Follow-up and recommended labs and tests     Follow up with primary care provider (you need to establish care with a primary provider), Physician No Ref-Primary, within 7 days to evaluate medication change, for hospital follow- up, and regarding new diagnosis.  The following labs/tests are recommended: BMP, CBC.  Also consider referral for gynecology for menorrhagia management and colonoscopy for age-related cancer screening for which she is overdue but can also be evaluated as part of her anemia workup     Reason for your hospital stay    You were hospitalized for right-sided chest pain. You ruled out for pulmonary embolism.  Echocardiogram was obtained was normal you had elevated cardiac labs and were seen in consultation by cardiology.  Your blood pressure was very high.  You were started on a new blood pressure medicine in addition to what you are already taking.  Management of anemia and diabetes was also discussed.  You were encouraged to stop smoking both cigarettes and marijuana.     Home Blood Pressure Monitor Order for DME - ONLY FOR DME    Home blood pressure monitorin. Avoid eating, smoking, and exercising for at least 30 minutes before taking  "a reading.    2. Put your left arm through the cuff loop. The bottom of the cuff should be about 1/2\" above your elbow. The cuff tubing should be positioned along the middle of the inside of your arm.    3. Pull the cuff so it is tightened evenly around your arm. Press the velcro material together to secure the cuff.    4. Sit in a chair with your feet flat on the floor. Rest your left arm on a table so that the arm cuff is at the same level as your heart.    5. Turn the monitor on. When the heart symbol appears next to a zero, the monitor is ready to measure. Inflate the cuff using either the automatic start button or the inflation bulb, depending on which monitor you are using.    6. Remain still during the reading. The cuff will deflate automatically.    7. When measurement is complete, your blood pressure and pulse readings will alternately display on the digital panel.    8. Wait 5-10 minutes before taking another reading.      I, the undersigned, certify that the above prescribed supplies are medically necessary for this patient and is both reasonable and necessary in reference to accepted standards of medical and necessary in reference to accepted standards of medical practice in the treatment of this patient's condition and is not prescribed as a convenience.        Diet    Follow this diet upon discharge:     Low Saturated Fat 2 g/day sodium diet restriction, moderate carbohydrate diet.  You were counseled to stop eating fast food       Significant Results and Procedures   Most Recent 3 CBC's:  Recent Labs   Lab Test 12/09/24  0639 12/08/24  1638 12/07/24  1721   WBC 9.1 9.8 10.9   HGB 11.1* 10.6* 11.1*   MCV 87 88 89    236 248     Most Recent 3 BMP's:  Recent Labs   Lab Test 12/10/24  2249 12/10/24  1819 12/10/24  1218 12/09/24  1721 12/09/24  0639 12/08/24  1425 12/07/24  1721   NA  --   --   --   --  135 140 140   POTASSIUM  --   --   --   --  3.7 3.6 3.8   CHLORIDE  --   --   --   --  99 102 104 "   CO2  --   --   --   --  26 26 25   BUN  --   --   --   --  11.6 12.1 9.9   CR  --   --   --   --  0.89 1.12* 0.89   ANIONGAP  --   --   --   --  10 12 11   ANSON  --   --   --   --  9.2 9.2 9.1   * 196* 218*   < > 183* 230* 143*    < > = values in this interval not displayed.   ,   Results for orders placed or performed during the hospital encounter of 12/08/24   CT Chest Pulmonary Embolism w Contrast    Narrative    EXAM: CT CHEST PULMONARY EMBOLISM WITH CONTRAST  LOCATION: [facility]  DATE: 12/08/2024    INDICATION: Shortness of breath, elevated D-dimer, PE; Female sex; Not pregnant; No prior imaging in the last 24 hours; Pulmonary Embolism Rule Out Criteria (PERC) score not > 0.  COMPARISON: None.  TECHNIQUE: CT chest pulmonary angiogram during arterial phase injection of IV contrast. Multiplanar reformats and MIP reconstructions were performed. Dose reduction techniques were used.   CONTRAST: 80 mL Isovue 370.    FINDINGS:  ANGIOGRAM CHEST: Pulmonary arteries are negative for pulmonary emboli. Main pulmonary artery is enlarged at 3.4 cm. Thoracic aorta is negative for dissection. No CT evidence of right heart strain.    LUNGS AND PLEURA: Mosaic attenuation in the lungs which is nonspecific, question some air trapping. Mild bronchial wall thickening and bronchiectasis. Areas of cystic bronchiectasis in the right lower lobe.    MEDIASTINUM/AXILLAE: Mildly enlarged right hilar node which is nonspecific at 1.2 cm short axis. No aneurysm.    CORONARY ARTERY CALCIFICATION: None.    UPPER ABDOMEN: No acute findings.    MUSCULOSKELETAL: No frankly destructive bony lesions.      Impression    IMPRESSION:  1.  No pulmonary embolism demonstrated.  2.  Mildly enlarged pulmonary artery, this can be associated with pulmonary hypertension.  3.  Mosaic attenuation in the lungs, question areas of air trapping.     Echocardiogram Complete     Value    LVEF  55%    Narrative     [id number]  [id number]  [id number]  [id number]^ANA ROSA^NAZARIO     [facility]  Echocardiography Laboratory  [address] [place] [address]     Name: MARITZA NEELY  MRN: [id number]  : 1975  Study Date: 2024 02:53 PM  Age: 49 yrs  Gender: Female  Patient Location: [facility]  Reason For Study: Pulmonary Hypertension  Ordering Physician: NAZARIO OSEGUERA  Referring Physician: None  Performed By: Kaitlynn Turner RDCS     BSA: 2.3 m2  Height: 66 in  Weight: 264 lb  HR: 81  BP: 175/107 mmHg  ______________________________________________________________________________  Procedure  Complete Portable Echo Adult. Definity (NDC #41011-818) given intravenously.  ______________________________________________________________________________  Interpretation Summary     1. The left ventricle is normal in size. The visual ejection fraction is  estimated at 55%.  2. There is mild to moderate concentric left ventricular hypertrophy.  3. The right ventricle is normal in structure, function and size.  4. No tricuspid regurgitation. Right ventricular systolic pressure could not  be approximated due to inadequate tricuspid regurgitation.     No previous echo for comparison.  ______________________________________________________________________________  Left Ventricle  The left ventricle is normal in size. There is mild to moderate concentric  left ventricular hypertrophy. The visual ejection fraction is estimated at  55%. Left ventricular diastolic function is normal. Normal left ventricular  wall motion.     Right Ventricle  The right ventricle is normal in structure, function and size.     Atria  Normal left atrial size. Right atrial size is normal. There is no atrial shunt  seen.     Mitral Valve  The mitral valve is normal in structure and function.     Tricuspid Valve  No tricuspid regurgitation. Right ventricular systolic pressure could not be  approximated due to inadequate  tricuspid regurgitation.     Aortic Valve  There is trace aortic regurgitation.     Pulmonic Valve  The pulmonic valve is normal in structure and function.     Vessels  Normal ascending, transverse (arch), and descending aorta. The inferior vena  cava was normal in size with preserved respiratory variability.     Pericardium  There is no pericardial effusion.     Rhythm  Sinus rhythm was noted.  ______________________________________________________________________________  MMode/2D Measurements & Calculations  IVSd: 1.5 cm     LVIDd: 5.1 cm  LVIDs: 3.4 cm  LVPWd: 1.5 cm  FS: 32.9 %  LV mass(C)d: 337.5 grams  LV mass(C)dI: 150.0 grams/m2  Ao root diam: 3.7 cm  LA dimension: 4.2 cm  asc Aorta Diam: 3.7 cm  LA/Ao: 1.1  Ao root diam index Ht(cm/m): 2.2  Ao root diam index BSA (cm/m2): 1.6  Asc Ao diam index BSA (cm/m2): 1.6  Asc Ao diam index Ht(cm/m): 2.2  LA Volume (BP): 93.2 ml     LA Volume Index (BP): 41.4 ml/m2  RWT: 0.59  TAPSE: 2.2 cm     Doppler Measurements & Calculations  MV E max earl: 91.2 cm/sec  MV A max earl: 54.5 cm/sec  MV E/A: 1.7  MV dec time: 0.18 sec  PA acc time: 0.16 sec  E/E' av.4  Lateral E/e': 16.3  Medial E/e': 16.5  RV S Earl: 13.7 cm/sec     ______________________________________________________________________________  Report approved by: Baldemar Hoyos MD on 2024 03:49 PM         Cardiac Catheterization    Narrative    Normal coronary arteries      NM Lexiscan stress test (nuc card)     Value    Target     Baseline Systolic     Baseline Diastolic BP 96    Last Stress Systolic     Last Stress Diastolic BP 82    Baseline HR 78    Max HR  96    Max Predicted HR  56    Rate Pressure Product 14,592.0    Left Ventricular EF 54    Narrative      The nuclear stress test is abnormal.    There is a small area of infarction in the distal anterior and apical   segment(s) of the left ventricle associated with will-infarct ischemia.   Presence of significant breat  attenuation and apical thinning decreases   accuracy.    There is a small area of a mild degree of nontransmural infarction in   the inferolateral segment(s) of the left ventricle. Soft tissue   attenuation decreases accuracy.    Left ventricular function is low normal.    The left ventricular ejection fraction at stress is 54%. Mild apcial   hypokinesis.    There is no prior study for comparison.    Suggest CT coronary angiography if clincially approrpaite.         Discharge Medications   Current Discharge Medication List        START taking these medications    Details   amLODIPine (NORVASC) 10 MG tablet Take 1 tablet (10 mg) by mouth daily.  Qty: 30 tablet, Refills: 0    Associated Diagnoses: Hypertensive urgency      aspirin 81 MG EC tablet Take 1 tablet (81 mg) by mouth daily.  Qty: 14 tablet, Refills: 0    Associated Diagnoses: NSTEMI (non-ST elevated myocardial infarction) (H)      atorvastatin (LIPITOR) 40 MG tablet Take 1 tablet (40 mg) by mouth daily.  Qty: 30 tablet, Refills: 0    Associated Diagnoses: Chest pain, unspecified type; Hypercholesteremia      lisinopril-hydrochlorothiazide (ZESTORETIC) 20-25 MG tablet Take 1 tablet by mouth daily.  Qty: 14 tablet, Refills: 0    Associated Diagnoses: Resistant hypertension           STOP taking these medications       lisinopril-hydrochlorothiazide (ZESTORETIC) 10-12.5 MG tablet Comments:   Reason for Stopping:             Allergies   No Known Allergies

## 2024-12-11 NOTE — PROGRESS NOTES
Writer assisted patient in establishing with new PCP at [facility] per MD request.    Dec18 Provider Visit 10:10 AM  Eliana Kapoor  [address] [address] [phone]     Writer shared details of above appointment with patient.  Bedside RN to review discharge orders and follow up with patient prior to her discharging from the hospital today.

## 2024-12-11 NOTE — DISCHARGE INSTRUCTIONS
Cardiac Angiogram Discharge Instructions - Radial    After you go home:    Have an adult stay with you until tomorrow.  Drink extra fluids for 2 days.  You may resume your normal diet.  No smoking       For 24 hours - due to the sedation you received:  Relax and take it easy.  Do NOT make any important or legal decisions.  Do NOT drive or operate machines at home or at work.  Do NOT drink alcohol.    Care of Wrist Puncture Site:    For the first 24 hrs - check the puncture site every 1-2 hours while awake.  It is normal to have soreness at the puncture site and mild tingling in your hand for up to 3 days.  Remove the bandaid after 24 hours. If there is minor oozing, apply another bandaid and remove it after 12 hours.  You may shower tomorrow.  Do NOT take a bath, or use a hot tub or pool for at least 3 days. Do NOT scrub the site. Do not use lotion or powder near the puncture site.           Activity:        For 2 days:   do not use your hand or arm to support your weight (such as rising from a chair)   do not bend your wrist (such as lifting a garage door).  do not lift more than 5 pounds or exercise your arm (such as tennis, golf or bowling).  Do NOT do any heavy activity such as exercise, lifting, or straining.     Bleeding:    If you start bleeding from the site in your wrist, sit down and press firmly on/above the site for 10 minutes.   Once bleeding stops, keep arm still for 2 hours.   Call [facility] as soon as you can.       Call 911 right away if you have heavy bleeding or bleeding that does not stop.      Medicines:    If you are taking an antiplatelet medication such as Plavix, Brilinta or Effient, do not stop taking it until you talk to your cardiologist.      If you are on Metformin (Glucophage), do not restart it until you have blood tests (within 2 to 3 days after discharge).  After you have your blood drawn, you may restart the Metformin.   Take your medications, including blood thinners, unless your  provider tells you not to.    If you take Coumadin (Warfarin), have your INR checked by your provider in  3-5 days. Call your clinic to schedule this.  If you have stopped any medicines, check with your provider about when to restart them.    Follow Up Appointments:    Follow up with [facility] Nurse Practitioner at [facility] of patient preference in 7-10 days.    Call the clinic if:    You have a large or growing hard lump around the site.  The site is red, swollen, hot or tender.  Blood or fluid is draining from the site.  You have chills or a fever greater than 101 F (38 C).  Your arm feels numb, cool or changes color.  You have hives, a rash or unusual itching.  Any questions or concerns.          [facility] [place]:    [phone] [facility] (7 days a week)    Or you may contact your provider via My Chart

## 2024-12-11 NOTE — PLAN OF CARE
Goal Outcome Evaluation:      Plan of Care Reviewed With: patient      Patient is up in the room independent, tolerated food, denies pain, Blood pressure is stable RA. Discharge home with .

## 2024-12-11 NOTE — PLAN OF CARE
PRIMARY Concern: HTN, CP; post angiogram   Tests/Procedures for NEXT shift: BG checks  Consults? (Pending/following, signed-off?): cards following   Safety Risk CONCERNS (fall risk, behaviors, etc.): fall risk       Where is patient from? (Home, TCU, etc.): home   Isolation/Type: none  Other Important info for next shift: TR band removed at 1900, arm board in place   Anticipated DC date & active delays: possible discharge tomorrow   SUMMARY NOTE:  Orientation/Cognitive: A/Ox4  Observation Goals (Met/ Not Met): inpt   Mobility Level/Assist Equipment: IND at baseline, off bedrest since 1830  Antibiotics & Plan (IV/po, length of tx left): none   Pain Management: denies pain   Tele/VS/O2: VSS on room air, expt elevated BP, Tele SR   ABNL Lab/BG: , 196  Diet: regular, good appetite  Bowel/Bladder: due to void post procedure   Skin Concerns: Right radial access site, bandage in place, no hematoma or bruising   Drains/Devices: IV SL  Patient Stated Goal for Today: to go home

## 2024-12-11 NOTE — CONSULTS
"NUTRITION EDUCATION    REASON FOR ASSESSMENT:  Provider Order - \"T2DM, HTN, obesity, non fast food dietary counseling\"     NUTRITION HISTORY:  Information obtained from patient. Patient reports eating 2 meals/day (lunch and dinner). She indicates she has a history of overindulging, specifically at restaurants. Udell how to cook at a young age and attributes her food knowledge to her grandmother's influence. Works as a  (5 days/wk). Likes to cook. Very motivated to make lifestyle changes.       Breakfast: skips, wakes at 6 am and working by 8 am   Lunch: out to eat, usually [employer] (onion rings or mozzarella fries)  Dinner: fried chicken (vegetable or canola oil) or baked fish w/ rice, corn on the cob, and biscuits or steak w/ potatoes,  corn, and garlic bread    Patient denies prior nutrition education.     CURRENT DIET:  Regular    NUTRITION DIAGNOSIS:  Food- and nutrition-related knowledge deficit R/t no prior nutrition education as evidenced by need for education regarding blood sugar management, hypertension, obesity, and navigating eating out & at home cooking    INTERVENTIONS:    Nutrition Prescription:  Recommend patient to follow Heart Healthy Consistent Carbohydrate Nutrition Therapy. Refer to additional handouts regarding reading food labels, MyPlate suggestions, and physical activity.     Implementation:      *  Nutrition Education (Content):   A)  Provided handouts: [facility] Heart Healthy Consistent Carbohydrate Nutrition Therapy; Eat Right -  MyPlate, Food Labels, Physical Activity    B)  Discussed handouts, highlights, and additional comments/tips      *  Nutrition Education (Application):   A)  Discussed current eating habits and recommended alternative food choices      *  Anticipate good compliance      *  Diet Education - refer to Education Flowsheet    Goals:      *  Patient verbalizes understanding of diet recommendations      *  All of the above goals met during the education " session    Follow Up/Monitoring:      *  Provided RD contact information for future questions      *  Recommended Out-Patient Nutrition Referral, if further diet instructions are needed    Halley Osman RD, LD

## 2024-12-12 ENCOUNTER — PATIENT OUTREACH (OUTPATIENT)
Dept: CARE COORDINATION | Facility: CLINIC | Age: 49
End: 2024-12-12

## 2024-12-12 ENCOUNTER — TELEPHONE (OUTPATIENT)
Dept: CARDIOLOGY | Facility: CLINIC | Age: 49
End: 2024-12-12

## 2024-12-12 NOTE — TELEPHONE ENCOUNTER
Patient was admitted to [facility] on 12/8/24 for evaluation of right-sided chest pain. She was admitted for management of hypertensive urgency.    PMH: hypertension with inconsistent treatment adherence, tobacco use, marijuana use, chronic anemia, and obesity.     12/8/24: Patient ruled out for PE by CT angiogram chest.    12/9/24: Echo showed EF of 55%, mild to moderate concentric LVH, normal RV and no significant valve disease.     12/10/24: cMRI showed a small area of infarction in the distal anterior and apical segments of the left ventricle associated with will-infarct ischemia.     12/10/24: Coronary angiogram via RRA showed normal coronary arteries.    Pt was started on Norvasc, ASA, Lipitor. PTA Zestoretic dosage was increased at time of discharge.    Called patient to discuss any post hospital d/c questions she may have, review medication changes, and confirm f/u appts. Patient denied any questions regarding new medications or changes with their PTA medications.    Patient denied any SOB or lightheadedness. States she does still experience some intermittent episodes of right anterior chest pain upon waking. States it comes on when she lays on her right side. Relieved after she takes her AM medications. Will continue to monitor.    RRA cardiac cath site is without bleeding, swelling, redness or signs of infection.     No cardiology follow up ordered. Pt to follow up with a PMD. Pt is waiting for insurance approval to go through so she can get established with a PMD. Dr. Samano Team RN phone number provided.    Patient advised to call clinic with any cardiac related questions or concerns prior to this kate't. Patient verbalized understanding and agreed with plan. LEE Gutierrez RN.

## 2024-12-12 NOTE — PROGRESS NOTES
Connected Care Resource Center: [facility]    Background: Transitional Care Management program identified per system criteria and reviewed by [facility] team for possible outreach.    Assessment: Upon chart review, CCR Team member will not proceed with patient outreach related to this episode of Transitional Care Management program due to reason below:    Patient has active communication with a nurse, provider or care team for reason of post-hospital follow up plan.  Outreach call by [facility] team not indicated to minimize duplicative efforts.     Plan: Transitional Care Management episode addressed appropriately per reason noted above.      LYNNETTE Tobias  [facility], [facility]    *Connected Care Resource Team does NOT follow patient ongoing. Referrals are identified based on internal discharge reports and the outreach is to ensure patient has an understanding of their discharge instructions.

## 2024-12-17 PROBLEM — E11.9 DIABETES MELLITUS, TYPE 2 (H): Status: ACTIVE | Noted: 2024-12-17

## 2025-02-05 ENCOUNTER — OFFICE VISIT (OUTPATIENT)
Dept: URGENT CARE | Facility: URGENT CARE | Age: 50
End: 2025-02-05
Payer: COMMERCIAL

## 2025-02-05 ENCOUNTER — ANCILLARY PROCEDURE (OUTPATIENT)
Dept: GENERAL RADIOLOGY | Facility: CLINIC | Age: 50
End: 2025-02-05
Attending: PHYSICIAN ASSISTANT
Payer: COMMERCIAL

## 2025-02-05 VITALS
OXYGEN SATURATION: 94 % | HEART RATE: 104 BPM | RESPIRATION RATE: 22 BRPM | DIASTOLIC BLOOD PRESSURE: 124 MMHG | TEMPERATURE: 97.7 F | SYSTOLIC BLOOD PRESSURE: 193 MMHG

## 2025-02-05 DIAGNOSIS — N76.0 BACTERIAL VAGINOSIS: ICD-10-CM

## 2025-02-05 DIAGNOSIS — J40 BRONCHITIS WITH ACUTE WHEEZING: ICD-10-CM

## 2025-02-05 DIAGNOSIS — I1A.0 RESISTANT HYPERTENSION: ICD-10-CM

## 2025-02-05 DIAGNOSIS — R68.89 INFLUENZA-LIKE SYMPTOMS: Primary | ICD-10-CM

## 2025-02-05 DIAGNOSIS — I16.0 HYPERTENSIVE URGENCY: ICD-10-CM

## 2025-02-05 DIAGNOSIS — B96.89 BACTERIAL VAGINOSIS: ICD-10-CM

## 2025-02-05 LAB
CLUE CELLS: PRESENT
TRICHOMONAS, WET PREP: ABNORMAL
WBC'S/HIGH POWER FIELD, WET PREP: ABNORMAL
YEAST, WET PREP: ABNORMAL

## 2025-02-05 PROCEDURE — 87210 SMEAR WET MOUNT SALINE/INK: CPT | Performed by: PHYSICIAN ASSISTANT

## 2025-02-05 PROCEDURE — 71046 X-RAY EXAM CHEST 2 VIEWS: CPT | Mod: TC | Performed by: STUDENT IN AN ORGANIZED HEALTH CARE EDUCATION/TRAINING PROGRAM

## 2025-02-05 RX ORDER — LISINOPRIL AND HYDROCHLOROTHIAZIDE 20; 25 MG/1; MG/1
1 TABLET ORAL DAILY
Qty: 90 TABLET | Refills: 0 | Status: SHIPPED | OUTPATIENT
Start: 2025-02-05

## 2025-02-05 RX ORDER — ALBUTEROL SULFATE 90 UG/1
2 INHALANT RESPIRATORY (INHALATION) EVERY 6 HOURS PRN
Qty: 18 G | Refills: 0 | Status: SHIPPED | OUTPATIENT
Start: 2025-02-05

## 2025-02-05 RX ORDER — METRONIDAZOLE 500 MG/1
500 TABLET ORAL 2 TIMES DAILY
Qty: 14 TABLET | Refills: 0 | Status: SHIPPED | OUTPATIENT
Start: 2025-02-05 | End: 2025-02-12

## 2025-02-05 RX ORDER — AMLODIPINE BESYLATE 10 MG/1
10 TABLET ORAL DAILY
Qty: 90 TABLET | Refills: 0 | Status: SHIPPED | OUTPATIENT
Start: 2025-02-05

## 2025-02-05 RX ORDER — PREDNISONE 20 MG/1
40 TABLET ORAL DAILY
Qty: 10 TABLET | Refills: 0 | Status: SHIPPED | OUTPATIENT
Start: 2025-02-05 | End: 2025-02-10

## 2025-02-05 RX ORDER — IPRATROPIUM BROMIDE AND ALBUTEROL SULFATE 2.5; .5 MG/3ML; MG/3ML
3 SOLUTION RESPIRATORY (INHALATION) ONCE
Status: COMPLETED | OUTPATIENT
Start: 2025-02-05 | End: 2025-02-05

## 2025-02-05 RX ADMIN — IPRATROPIUM BROMIDE AND ALBUTEROL SULFATE 3 ML: 2.5; .5 SOLUTION RESPIRATORY (INHALATION) at 13:28

## 2025-02-05 ASSESSMENT — ENCOUNTER SYMPTOMS
DIARRHEA: 0
VOMITING: 1
WHEEZING: 1
SORE THROAT: 0
CHILLS: 1
SHORTNESS OF BREATH: 1
RHINORRHEA: 1
DIAPHORESIS: 1
COUGH: 1

## 2025-02-05 NOTE — LETTER
February 5, 2025      Edna Horton  [address]  [place] [address]        To Whom It May Concern:    Edna Horton  was seen on 2/5/2025.  Please excuse her  until 2/10/25 due to injury.        Sincerely,        Leigh Lloyd PA-C    Electronically signed

## 2025-02-05 NOTE — PATIENT INSTRUCTIONS
Use albuterol inhaler to help with wheezing.     Take course of prednisone to help with wheezing, take in morning.   This can worsen blood sugars. Eat low sugar and carb diet.     Take your blood pressure medications daily.     I prescribed an antibiotic for your BV.  No alcohol with medication.

## 2025-02-05 NOTE — PROGRESS NOTES
Assessment & Plan:        ICD-10-CM    1. Influenza-like symptoms  R68.89       2. Hypertensive urgency  I16.0 amLODIPine (NORVASC) 10 MG tablet      3. Resistant hypertension  I1A.0 lisinopril-hydrochlorothiazide (ZESTORETIC) 20-25 MG tablet      4. Bronchitis with acute wheezing  J40 ipratropium - albuterol 0.5 mg/2.5 mg/3 mL (DUONEB) neb solution 3 mL     predniSONE (DELTASONE) 20 MG tablet     albuterol (PROAIR HFA/PROVENTIL HFA/VENTOLIN HFA) 108 (90 Base) MCG/ACT inhaler     XR Chest 2 Views      5. Vaginal itching  N89.8 Wet prep - Clinic Collect            Plan/Clinical Decision Making:          No follow-ups on file.     At the end of the encounter, I discussed results, diagnosis, medications. Discussed red flags for immediate return to clinic/ER, as well as indications for follow up if no improvement. Patient understood and agreed to plan. Patient was stable for discharge.        Leigh Lloyd PA-C on 2/5/2025 at 12:17 PM          Subjective:     HPI:    Edna is a 49 year old female who presents to clinic today for the following health issues:  Chief Complaint   Patient presents with    URI     Cough, runny nose and fever.    Symptoms x5 days     Medication Request     Needs BP meds refilled if possible      HPI    1) Cough, runny nose, fever.   Exposed to grandchild who was sick.   Smoker: 5-6 cigs a day.   Feeling feverish    2) HTN  Seen in ED with Hypertension urgency, was admitted hospital.   Running out of medications for BP.   Has primary visit tomorrow to establish care.     Review of Systems   Constitutional:  Positive for chills and diaphoresis.   HENT:  Positive for congestion and rhinorrhea. Negative for sore throat.    Respiratory:  Positive for cough, shortness of breath (mild) and wheezing (slight).    Gastrointestinal:  Positive for vomiting (slight). Negative for diarrhea.         Patient Active Problem List   Diagnosis    Hypertensive urgency    Chest pain, unspecified type     Hypercholesteremia    Abnormal cardiovascular stress test    NSTEMI (non-ST elevated myocardial infarction) (H)    Diabetes mellitus, type 2 (H)        No past medical history on file.    Social History     Tobacco Use    Smoking status: Every Day     Types: Cigarettes    Smokeless tobacco: Not on file   Substance Use Topics    Alcohol use: Not on file             Objective:     Vitals:    02/05/25 1149   BP: (!) 193/124   Pulse: 104   Resp: 22   Temp: 97.7  F (36.5  C)   TempSrc: Tympanic   SpO2: 94%         Physical Exam   EXAM: ***  Pleasant, alert, appropriate appearance. NAD.  Head Exam: Normocephalic, atraumatic.  Eye Exam:  PERRLA, EOMI, non icteric/injection.    Ear Exam: TMs grey without bulging. Normal canals.  Normal pinna.  Nose Exam: Normal external nose.    OroPharynx Exam:  Moist mucous membranes. No erythema, pharynx without exudate or hypertrophy.  Neck/Thyroid Exam:  No LAD.  No nodules or enlargement.  Chest/Respiratory Exam: CTAB.  Cardiovascular Exam: RRR. No murmur or rubs.  ABD: soft, Non-tender, normal bowel sounds, no rebound/guarding.  No masses/organomegaly.  Ext/musculoskeletal: Grossly intact, No edema, DP pulses 2+ equal bilateral.  Neuro: CN II-XII intact grossly intact.  Skin: no rash or lesion.      Results:  Results for orders placed or performed in visit on 02/05/25   XR Chest 2 Views     Status: None    Narrative    EXAM: XR CHEST 2 VIEWS  LOCATION: [facility]  DATE: 2/5/2025    INDICATION:  Bronchitis with acute wheezing  COMPARISON: CT chest 12/8/2024. Chest radiograph 12/7/2024.      Impression    IMPRESSION: Apparent minimal perihilar bronchial wall thickening suggestive of reactive airway/bronchitis. No focal consolidation, pleural effusion or pneumothorax. Similar cardiomediastinal silhouette..   Results for orders placed or performed in visit on 02/05/25   Wet prep - Clinic Collect     Status: Abnormal    Specimen: Vagina; Swab   Result Value Ref Range     Trichomonas Absent Absent    Yeast Absent Absent    Clue Cells Present (A) Absent    WBCs/high power field 2+ (A) None

## 2025-07-17 ENCOUNTER — OFFICE VISIT (OUTPATIENT)
Dept: URGENT CARE | Facility: URGENT CARE | Age: 50
End: 2025-07-17
Payer: COMMERCIAL

## 2025-07-17 VITALS
RESPIRATION RATE: 16 BRPM | DIASTOLIC BLOOD PRESSURE: 125 MMHG | TEMPERATURE: 98.7 F | SYSTOLIC BLOOD PRESSURE: 195 MMHG | OXYGEN SATURATION: 97 % | WEIGHT: 252 LBS | HEART RATE: 92 BPM

## 2025-07-17 DIAGNOSIS — N89.8 VAGINAL IRRITATION: ICD-10-CM

## 2025-07-17 DIAGNOSIS — E78.00 HYPERCHOLESTEREMIA: ICD-10-CM

## 2025-07-17 DIAGNOSIS — B96.89 BV (BACTERIAL VAGINOSIS): ICD-10-CM

## 2025-07-17 DIAGNOSIS — I1A.0 RESISTANT HYPERTENSION: ICD-10-CM

## 2025-07-17 DIAGNOSIS — N76.0 BV (BACTERIAL VAGINOSIS): ICD-10-CM

## 2025-07-17 DIAGNOSIS — L29.9 ITCHING: Primary | ICD-10-CM

## 2025-07-17 DIAGNOSIS — E11.9 TYPE 2 DIABETES MELLITUS WITHOUT COMPLICATION, WITHOUT LONG-TERM CURRENT USE OF INSULIN (H): ICD-10-CM

## 2025-07-17 LAB
ALBUMIN SERPL-MCNC: 3.4 G/DL (ref 3.4–5)
ALP SERPL-CCNC: 132 U/L (ref 40–150)
ALT SERPL W P-5'-P-CCNC: 13 U/L (ref 0–50)
ANION GAP SERPL CALCULATED.3IONS-SCNC: 7 MMOL/L (ref 3–14)
AST SERPL W P-5'-P-CCNC: 18 U/L (ref 0–45)
BASOPHILS # BLD AUTO: 0.1 10E3/UL (ref 0–0.2)
BASOPHILS NFR BLD AUTO: 1 %
BILIRUB SERPL-MCNC: 0.6 MG/DL (ref 0.2–1.3)
BUN SERPL-MCNC: 10 MG/DL (ref 7–30)
CALCIUM SERPL-MCNC: 10 MG/DL (ref 8.5–10.1)
CHLORIDE BLD-SCNC: 100 MMOL/L (ref 94–109)
CLUE CELLS: PRESENT
CO2 SERPL-SCNC: 31 MMOL/L (ref 20–32)
CREAT SERPL-MCNC: 0.8 MG/DL (ref 0.52–1.04)
EGFRCR SERPLBLD CKD-EPI 2021: 90 ML/MIN/1.73M2
EOSINOPHIL # BLD AUTO: 0.8 10E3/UL (ref 0–0.7)
EOSINOPHIL NFR BLD AUTO: 7 %
ERYTHROCYTE [DISTWIDTH] IN BLOOD BY AUTOMATED COUNT: 12.9 % (ref 10–15)
EST. AVERAGE GLUCOSE BLD GHB EST-MCNC: >384 MG/DL
GLUCOSE BLD-MCNC: 469 MG/DL (ref 70–99)
HBA1C MFR BLD: >15 % (ref 0–5.6)
HCT VFR BLD AUTO: 32.3 % (ref 35–47)
HGB BLD-MCNC: 10.8 G/DL (ref 11.7–15.7)
IMM GRANULOCYTES # BLD: 0 10E3/UL
IMM GRANULOCYTES NFR BLD: 0 %
LYMPHOCYTES # BLD AUTO: 3.2 10E3/UL (ref 0.8–5.3)
LYMPHOCYTES NFR BLD AUTO: 31 %
MCH RBC QN AUTO: 27.2 PG (ref 26.5–33)
MCHC RBC AUTO-ENTMCNC: 33.4 G/DL (ref 31.5–36.5)
MCV RBC AUTO: 81 FL (ref 78–100)
MONOCYTES # BLD AUTO: 0.8 10E3/UL (ref 0–1.3)
MONOCYTES NFR BLD AUTO: 8 %
NEUTROPHILS # BLD AUTO: 5.4 10E3/UL (ref 1.6–8.3)
NEUTROPHILS NFR BLD AUTO: 53 %
PLATELET # BLD AUTO: 247 10E3/UL (ref 150–450)
POTASSIUM BLD-SCNC: 3.9 MMOL/L (ref 3.4–5.3)
PROT SERPL-MCNC: 8.2 G/DL (ref 6.8–8.8)
RBC # BLD AUTO: 3.97 10E6/UL (ref 3.8–5.2)
SODIUM SERPL-SCNC: 138 MMOL/L (ref 135–145)
TRICHOMONAS, WET PREP: ABNORMAL
WBC # BLD AUTO: 10.3 10E3/UL (ref 4–11)
WBC'S/HIGH POWER FIELD, WET PREP: ABNORMAL
YEAST, WET PREP: ABNORMAL

## 2025-07-17 RX ORDER — METRONIDAZOLE 500 MG/1
500 TABLET ORAL 2 TIMES DAILY
Qty: 14 TABLET | Refills: 0 | Status: SHIPPED | OUTPATIENT
Start: 2025-07-17 | End: 2025-07-24

## 2025-07-17 RX ORDER — METFORMIN HYDROCHLORIDE 500 MG/1
500 TABLET, EXTENDED RELEASE ORAL 2 TIMES DAILY WITH MEALS
Qty: 60 TABLET | Refills: 3 | Status: SHIPPED | OUTPATIENT
Start: 2025-07-17

## 2025-07-17 RX ORDER — AMLODIPINE BESYLATE 10 MG/1
10 TABLET ORAL DAILY
Qty: 90 TABLET | Refills: 0 | Status: SHIPPED | OUTPATIENT
Start: 2025-07-17

## 2025-07-17 RX ORDER — LISINOPRIL AND HYDROCHLOROTHIAZIDE 20; 25 MG/1; MG/1
1 TABLET ORAL DAILY
Qty: 90 TABLET | Refills: 0 | Status: SHIPPED | OUTPATIENT
Start: 2025-07-17

## 2025-07-17 RX ORDER — ATORVASTATIN CALCIUM 40 MG/1
40 TABLET, FILM COATED ORAL DAILY
Qty: 90 TABLET | Refills: 0 | Status: SHIPPED | OUTPATIENT
Start: 2025-07-17

## 2025-07-17 NOTE — PROGRESS NOTES
Itching  - CBC with platelets and differential  - Hemoglobin A1c  - Comprehensive metabolic panel (BMP + Alb, Alk Phos, ALT, AST, Total. Bili, TP)    Resistant hypertension  - amLODIPine (NORVASC) 10 MG tablet; Take 1 tablet (10 mg) by mouth daily.  - lisinopril-hydrochlorothiazide (ZESTORETIC) 20-25 MG tablet; Take 1 tablet by mouth daily.    General Tips for All Ages:    Lifestyle Modifications:  Why: Lifestyle changes can significantly impact blood pressure.  What to Do:  Maintain a healthy weight.  Adopt a balanced diet with low sodium.  Engage in regular physical activity.  Limit alcohol intake.  Quit smoking.    Regular Blood Pressure Monitoring:  Why: Keep track of your blood pressure to gauge progress.  What to Do: Invest in a reliable home blood pressure monitor and follow your healthcare provider's recommendations.    Stress Management:  Why: Chronic stress can contribute to elevated blood pressure.  What to Do: Incorporate stress-reducing activities like deep breathing, meditation, or hobbies.    For Adults (18 Years and Older):    Prescription Medications (if prescribed):  Why: Some may need medications to control blood pressure effectively.  What to Do: Take medications as directed by your healthcare provider.    Over-the-Counter (OTC) Supplements:  Why: Some supplements may have a modest impact on blood pressure.  What to Use:  Omega-3 Fatty Acids: Found in fish oil.  Coenzyme Q10 (CoQ10): Consult with your healthcare provider before taking any supplements.    For Adults and Seniors (50 Years and Older):    Calcium Channel Blockers (if prescribed):  Why: Can help relax blood vessels.  What to Do: Take medications, like amlodipine, as prescribed.    Thiazide Diuretics (if prescribed):  Why: Promotes fluid loss, reducing blood volume.  What to Do: Follow your healthcare provider's recommendations for hydrochlorothiazide or similar medications.    For Seniors (65 Years and Older):    Regular Health  Check-ups:  Why: Regular monitoring is crucial for seniors.  What to Do: Attend routine health check-ups, including blood pressure assessments.    Medication Adjustments (if needed):  Why: Medication needs may change with age.  What to Do: Work closely with your healthcare provider to adjust medications as necessary.    All Ages:    Limit Caffeine Intake:  Why: Excessive caffeine can temporarily raise blood pressure.  What to Do: Moderate your coffee and tea consumption.    Follow-Up:    I recommended that the patient check their blood pressure at home 2-3 times daily and write the numbers down.  If they continue to have elevated blood pressures, I would like them to have a follow-up with their primary care provider in the next 2 to 3 weeks.  If there blood pressure numbers are within normal limits, the patient may follow-up with their primary care provider in the next 1 to 2 months.  Patient educated on red flag symptoms and asked to go directly to the ED if these symptoms present themselves.     Remember, hypertension management is a lifelong commitment. By combining lifestyle changes, medications (if prescribed), and regular monitoring, you can take control of your blood pressure and maintain a healthier life.    Wishing you good health!     Follow up with PCP in 1-3 months for any medication adjustments.       Hypercholesteremia  - atorvastatin (LIPITOR) 40 MG tablet; Take 1 tablet (40 mg) by mouth daily.    Vaginal irritation  - Wet prep - Clinic Collect    BV (bacterial vaginosis)  - metroNIDAZOLE (FLAGYL) 500 MG tablet; Take 1 tablet (500 mg) by mouth 2 times daily for 7 days.    Type 2 diabetes mellitus without complication, without long-term current use of insulin (H)  - metFORMIN (GLUCOPHAGE XR) 500 MG 24 hr tablet; Take 1 tablet (500 mg) by mouth 2 times daily (with meals).  - Adult Diabetes Education  Referral; Future    General Tips for All Ages:    Healthy Eating:  Why: Controlling your diet  helps regulate blood sugar levels.  What to Do:  Choose whole grains, lean proteins, and a variety of colorful fruits and vegetables.  Watch portion sizes and limit added sugars.    Regular Physical Activity:  Why: Exercise helps lower blood sugar and improves overall health.  What to Do:  Aim for at least 150 minutes of moderate-intensity exercise per week.  Include a mix of aerobic and strength training exercises.    Regular Blood Sugar Monitoring:  Why: Monitoring helps you understand your diabetes control.  What to Do:  Follow your healthcare provider's recommendations for testing frequency.  Keep a log of your readings for discussions during appointments.    For Children (Under 18 Years):    Pediatrician Guidance:  Why: Children's diabetes management may differ from adults.  What to Do:  Work closely with your child's pediatrician for a personalized plan.    Insulin or Oral Medications (if prescribed):  Why: Depending on the type of diabetes, medications may be necessary.  What to Do:  Administer insulin or oral medications as directed by the healthcare provider.    For Adults (18-64 Years):    Oral Medications or Insulin (if prescribed):  Why: Medications help control blood sugar levels.  What to Do:  Take medications as directed by your healthcare provider.  Insulin may be prescribed, and proper injection techniques should be learned.    Annual Eye Exams:  Why: Diabetes can impact eye health.  What to Do:  Schedule regular eye exams to detect any diabetes-related eye issues early.    For Seniors (65 Years and Older):    Medication Adjustments (if needed):  Why: Medication needs may change with age.  What to Do:  Regularly review medications with your healthcare provider for adjustments.    Foot Care:  Why: Diabetes can affect foot health.  What to Do:  Inspect your feet daily and report any changes to your healthcare provider.    All Ages:    Dental Care:  Why: Diabetes increases the risk of gum  disease.  What to Do:  Schedule regular dental check-ups and practice good oral hygiene.    Emergency Preparedness:  Why: Be prepared for potential hypoglycemic episodes.  What to Do:  Carry a diabetes emergency kit with snacks and glucose tablets.    Community Support:  Why: Connecting with others facing diabetes can provide valuable support.  What to Do:  Join diabetes support groups or online communities.    Regular Healthcare Check-ups:  Why: Regular appointments help monitor overall health.  What to Do:  Attend scheduled check-ups with your healthcare provider.    Remember, diabetes management is individualized. Work closely with your healthcare team to create a plan that fits your lifestyle and health needs. By actively participating in your care, you can lead a fulfilling and healthy life with diabetes.    Follow-up with primary care in the next 2 to 3 weeks.  Patient educated on red flag symptoms and asked to go directly to the ED if these symptoms present themselves.       Wishing you good health and well-managed diabetes!      40 minutes spent by me on the date of the encounter doing chart review, history and exam, documentation and further activities per the note    Moshe Jaeger PA-C  [facility]    Subjective   50 year old who presents to clinic today for the following health issues:    Urgent Care       HPI     Itchy irritated skin with no rash   Onset/Duration: 3 days - Patient has new exposure to cats and dogs.   Description  Location: Full body   Character: Very itchy but no rash   Itching: severe  Intensity:  moderate  Progression of Symptoms:  same  Accompanying signs and symptoms:   Fever: No  Body aches or joint pain: No  Sore throat symptoms: No  Recent cold symptoms: No  History:           Previous episodes of similar rash: None  New exposures:  None  Recent travel: No  Exposure to similar rash: No  Precipitating or alleviating factors: None   Therapies tried and outcome:  Vaseline     Patient states that she has intermittent numbness in the fingers and toes. Patient has a history of type 2 diabetes and does not take medication for this. Patient is supposed to be taking atorvastatin, amlodipine, and lisinopril/hydrochlorothiazide but has run out of these.     Patient states that she has had vaginal irritation and itch. She states that she has had blisters in the area. No blisters elsewhere. She denies dysuria, vaginal burn or vaginal discharge.     Review of Systems   Review of Systems   See HPI    Objective    Temp: 98.7  F (37.1  C) Temp src: Oral BP: (!) 195/125 Pulse: 92   Resp: 16 SpO2: 97 %       Physical Exam   Physical Exam  Constitutional:       General: She is not in acute distress.     Appearance: Normal appearance. She is normal weight. She is not ill-appearing, toxic-appearing or diaphoretic.   HENT:      Head: Normocephalic and atraumatic.   Cardiovascular:      Rate and Rhythm: Normal rate and regular rhythm.      Pulses: Normal pulses.      Heart sounds: Normal heart sounds. No murmur heard.     No friction rub. No gallop.   Pulmonary:      Effort: Pulmonary effort is normal. No respiratory distress.      Breath sounds: No stridor. No wheezing, rhonchi or rales.   Chest:      Chest wall: No tenderness.   Skin:     General: Skin is warm and dry.      Coloration: Skin is not jaundiced or pale.      Findings: No bruising, erythema, lesion or rash.   Neurological:      General: No focal deficit present.      Mental Status: She is alert and oriented to person, place, and time. Mental status is at baseline.      Gait: Gait normal.   Psychiatric:         Mood and Affect: Mood normal.         Behavior: Behavior normal.         Thought Content: Thought content normal.         Judgment: Judgment normal.          Recent Results (from the past 24 hours)   Wet prep - Clinic Collect    Specimen: Vagina; Swab   Result Value Ref Range    Trichomonas Absent Absent    Yeast Absent  Absent    Clue Cells Present (A) Absent    WBCs/high power field 1+ (A) None   CBC with platelets and differential    Narrative    The following orders were created for panel order CBC with platelets and differential.  Procedure                               Abnormality         Status                     ---------                               -----------         ------                     CBC with platelets and ...[[id number]]  Abnormal            Final result                 Please view results for these tests on the individual orders.   Hemoglobin A1c   Result Value Ref Range    Estimated Average Glucose >384 (H) <117 mg/dL    Hemoglobin A1C >15.0 (H) 0.0 - 5.6 %    Narrative    Results confirmed by repeat test.    Comprehensive metabolic panel (BMP + Alb, Alk Phos, ALT, AST, Total. Bili, TP)   Result Value Ref Range    Sodium 138 135 - 145 mmol/L    Potassium 3.9 3.4 - 5.3 mmol/L    Chloride 100 94 - 109 mmol/L    Carbon Dioxide (CO2) 31 20 - 32 mmol/L    Anion Gap 7 3 - 14 mmol/L    Urea Nitrogen 10 7 - 30 mg/dL    Creatinine 0.80 0.52 - 1.04 mg/dL    GFR Estimate 90 >60 mL/min/1.73m2    Calcium 10.0 8.5 - 10.1 mg/dL    Glucose 469 (H) 70 - 99 mg/dL    Alkaline Phosphatase 132 40 - 150 U/L    AST 18 0 - 45 U/L    ALT 13 0 - 50 U/L    Protein Total 8.2 6.8 - 8.8 g/dL    Albumin 3.4 3.4 - 5.0 g/dL    Bilirubin Total 0.6 0.2 - 1.3 mg/dL   CBC with platelets and differential   Result Value Ref Range    WBC Count 10.3 4.0 - 11.0 10e3/uL    RBC Count 3.97 3.80 - 5.20 10e6/uL    Hemoglobin 10.8 (L) 11.7 - 15.7 g/dL    Hematocrit 32.3 (L) 35.0 - 47.0 %    MCV 81 78 - 100 fL    MCH 27.2 26.5 - 33.0 pg    MCHC 33.4 31.5 - 36.5 g/dL    RDW 12.9 10.0 - 15.0 %    Platelet Count 247 150 - 450 10e3/uL    % Neutrophils 53 %    % Lymphocytes 31 %    % Monocytes 8 %    % Eosinophils 7 %    % Basophils 1 %    % Immature Granulocytes 0 %    Absolute Neutrophils 5.4 1.6 - 8.3 10e3/uL    Absolute Lymphocytes 3.2 0.8 - 5.3 10e3/uL     Absolute Monocytes 0.8 0.0 - 1.3 10e3/uL    Absolute Eosinophils 0.8 (H) 0.0 - 0.7 10e3/uL    Absolute Basophils 0.1 0.0 - 0.2 10e3/uL    Absolute Immature Granulocytes 0.0 <=0.4 10e3/uL       All labs that were finalized during the visit were reviewed by me personally. Any pending labs will be reviewed by urgent care staff in the following days. Patient will be notified either via [employer] message or phone call of any pertinent abnormal results. Patient was informed that we will not necessarily reach out if pending lab results are normal.

## (undated) DEVICE — TOTE ANGIO CORP PC15AT SAN32CC83O

## (undated) DEVICE — INTRO GLIDESHEATH SLENDER 6FR 10X45CM 60-1060

## (undated) DEVICE — GUIDEWIRE HI-TORQUE VERSACORE MOD J 1

## (undated) DEVICE — CATH DIAGNOSTIC RADIAL 5FR TIG 4.0

## (undated) DEVICE — SLEEVE TR BAND RADIAL COMPRESSION DEVICE 24CM TRB24-REG

## (undated) DEVICE — MANIFOLD KIT ANGIO AUTOMATED 014613

## (undated) DEVICE — DEFIB PRO-PADZ LVP LQD GEL ADULT 8900-2105-01

## (undated) DEVICE — KIT HAND CONTROL ANGIOTOUCH ACIST 65CM AT-P65

## (undated) RX ORDER — FENTANYL CITRATE 50 UG/ML
INJECTION, SOLUTION INTRAMUSCULAR; INTRAVENOUS
Status: DISPENSED
Start: 2024-12-10

## (undated) RX ORDER — HEPARIN SODIUM 1000 [USP'U]/ML
INJECTION, SOLUTION INTRAVENOUS; SUBCUTANEOUS
Status: DISPENSED
Start: 2024-12-10

## (undated) RX ORDER — NITROGLYCERIN 5 MG/ML
VIAL (ML) INTRAVENOUS
Status: DISPENSED
Start: 2024-12-10

## (undated) RX ORDER — LIDOCAINE HYDROCHLORIDE 10 MG/ML
INJECTION, SOLUTION EPIDURAL; INFILTRATION; INTRACAUDAL; PERINEURAL
Status: DISPENSED
Start: 2024-12-10

## (undated) RX ORDER — HEPARIN SODIUM 200 [USP'U]/100ML
INJECTION, SOLUTION INTRAVENOUS
Status: DISPENSED
Start: 2024-12-10

## (undated) RX ORDER — VERAPAMIL HYDROCHLORIDE 2.5 MG/ML
INJECTION, SOLUTION INTRAVENOUS
Status: DISPENSED
Start: 2024-12-10

## (undated) RX ORDER — REGADENOSON 0.08 MG/ML
INJECTION, SOLUTION INTRAVENOUS
Status: DISPENSED
Start: 2024-12-10